# Patient Record
Sex: MALE | Race: WHITE | NOT HISPANIC OR LATINO
[De-identification: names, ages, dates, MRNs, and addresses within clinical notes are randomized per-mention and may not be internally consistent; named-entity substitution may affect disease eponyms.]

---

## 2018-05-11 PROBLEM — Z00.00 ENCOUNTER FOR PREVENTIVE HEALTH EXAMINATION: Status: ACTIVE | Noted: 2018-05-11

## 2019-03-25 ENCOUNTER — APPOINTMENT (OUTPATIENT)
Dept: ORTHOPEDIC SURGERY | Facility: CLINIC | Age: 74
End: 2019-03-25
Payer: MEDICARE

## 2019-03-25 VITALS
BODY MASS INDEX: 28.32 KG/M2 | HEIGHT: 65 IN | SYSTOLIC BLOOD PRESSURE: 223 MMHG | DIASTOLIC BLOOD PRESSURE: 123 MMHG | WEIGHT: 170 LBS | HEART RATE: 92 BPM

## 2019-03-25 DIAGNOSIS — M48.061 SPINAL STENOSIS, LUMBAR REGION WITHOUT NEUROGENIC CLAUDICATION: ICD-10-CM

## 2019-03-25 PROCEDURE — 99204 OFFICE O/P NEW MOD 45 MIN: CPT

## 2019-03-25 NOTE — HISTORY OF PRESENT ILLNESS
[Worsening] : worsening [de-identified] : Referred by Dr. Danyel Figueroa for consult \par Went to [place] 2 months ago. \par In wheelchair \par Has MRI- lumbar stenosis \par He is here with his wife. His radiating right lower extremity pain in the L5 distribution.\par His attempt at an injection with temporary relief.\par No fever chills sweats nausea vomiting no bowel or bladder dysfunction, no recent weight loss or gain no night pain. This history is in addition to the intake form that I personally reviewed.

## 2019-03-25 NOTE — DISCUSSION/SUMMARY
[Surgical risks reviewed] : Surgical risks reviewed [de-identified] : Lumbar stenosis.\par Severe right-sided radiculopathy.\par We discussed all options including surgery.\par Lumbar decompression.\par Risks of surgery include infection, dural tear, nerve root injury, reherniation, future back pain, future leg pain, retained fragment, hematoma, urinary retention, worsening leg symptoms, footdrop, anesthetic risks, blood transfusion risks, positioning pain, visceral and vascular injury, deep vein thrombosis, pulmonary embolus, and death. Patient will need spinal orthosis pre and post operatively. All risks were explained not exclusive to the ones mentioned alternatives were discussed and all questions were answered the patient agrees and understands the above and is in complete agreement with the plan. \par Surgical website was provided.\par He'll go for his next injection but if it does not provide enough relief he should consider surgery and he will.\par Will see Dr. Figueroa tomorrow for LESI\par F/U in 1 week \par All options discussed including rest, medicine, home exercise, acupuncture, Chiropractic care, Physical Therapy, Pain management, and last resort surgery. \par All questions were answered, all alternatives discussed and the patient is in complete agreement with that plan. Follow-up appointment as instructed. Any issues and the patient will call or come in sooner. \par His wife agrees with the plan.\par Thank you for the consultation.

## 2019-03-25 NOTE — PHYSICAL EXAM
[UE/LE] : Sensory: Intact in bilateral upper & lower extremities [ALL] : dorsalis pedis, posterior tibial, femoral, popliteal, and radial 2+ and symmetric bilaterally [de-identified] : 5 out of 5 motor strength, sensation is intact and symmetrical full range of motion flexion extension and rotation, no palpatory tenderness full range of motion of hips knees shoulders and elbows (all four extremities), no atrophy, negative straight leg raise, no pathological reflexes, no swelling, normal ambulation, no apparent distress skin is intact, no palpable lymph nodes, no upper or lower extremity instability, alert and oriented x3 and normal mood. Normal finger-to nose test.  [de-identified] : 2/19/2019 MRI lumbar: Large right paracentral disc extrusion L3-4, Severe stenosis are seen within the left lateral recess and foraminal at L2-3. Severe right lateral recess stenosis at L3-4, Severe foraminal stenosis on the right at L4-5, Severe bilateral foraminal stenosis at L5-S1, canal stenosis most focal L3-4.

## 2019-03-25 NOTE — ADDENDUM
[FreeTextEntry1] : This note was authored by Bessie Inman working as a medical scribe for Dr. Oswald Gambino. The note was reviewed, edited, and revised by Dr. Oswald Gambino whom is in agreement with the exam findings, imaging findings, and treatment plan. 03/25/2019.

## 2019-03-25 NOTE — CONSULT LETTER
[Dear  ___] : Dear  [unfilled], [Consult Letter:] : I had the pleasure of evaluating your patient, [unfilled]. [Please see my note below.] : Please see my note below. [Sincerely,] : Sincerely, [FreeTextEntry2] : Dr. Danyel Fiugeroa\par [address]\par [place]\par [phone]  [FreeTextEntry3] : Oswald Gambino MD

## 2019-04-01 ENCOUNTER — APPOINTMENT (OUTPATIENT)
Dept: ORTHOPEDIC SURGERY | Facility: CLINIC | Age: 74
End: 2019-04-01
Payer: MEDICARE

## 2019-04-01 VITALS
HEART RATE: 103 BPM | WEIGHT: 170 LBS | SYSTOLIC BLOOD PRESSURE: 135 MMHG | DIASTOLIC BLOOD PRESSURE: 70 MMHG | HEIGHT: 65 IN | BODY MASS INDEX: 28.32 KG/M2

## 2019-04-01 PROCEDURE — 99214 OFFICE O/P EST MOD 30 MIN: CPT

## 2019-04-01 PROCEDURE — 72100 X-RAY EXAM L-S SPINE 2/3 VWS: CPT

## 2019-04-01 NOTE — ADDENDUM
[FreeTextEntry1] : This note was authored by Bessie Inman working as a medical scribe for Dr. Oswald Gambino. The note was reviewed, edited, and revised by Dr. Oswald Gambino whom is in agreement with the exam findings, imaging findings, and treatment plan. 04/1/2019.

## 2019-04-01 NOTE — PHYSICAL EXAM
[UE/LE] : Sensory: Intact in bilateral upper & lower extremities [ALL] : dorsalis pedis, posterior tibial, femoral, popliteal, and radial 2+ and symmetric bilaterally [de-identified] : 5 out of 5 motor strength, sensation is intact and symmetrical full range of motion flexion extension and rotation, no palpatory tenderness full range of motion of hips knees shoulders and elbows (all four extremities), no atrophy, negative straight leg raise, no pathological reflexes, no swelling, normal ambulation, no apparent distress skin is intact, no palpable lymph nodes, no upper or lower extremity instability, alert and oriented x3 and normal mood. Normal finger-to nose test.  [de-identified] : AP/lat flex ex:severe degenerative disc disease, loss of lordosis, no sponylolisthesis-reviewed with patient. \[facility] 2/19/2019 MRI lumbar: Large right paracentral disc extrusion L3-4, Severe stenosis are seen within the left lateral recess and foraminal at L2-3. Severe right lateral recess stenosis at L3-4, Severe foraminal stenosis on the right at L4-5, Severe bilateral foraminal stenosis at L5-S1, canal stenosis most focal L3-4.

## 2019-04-01 NOTE — DISCUSSION/SUMMARY
[Surgical risks reviewed] : Surgical risks reviewed [de-identified] : Lumbar stenosis.\par Severe right-sided radiculopathy.\par We discussed all options including surgery.\par Lumbar decompression.\par no fusion\par Risks of surgery include infection, dural tear, nerve root injury, reherniation, future back pain, future leg pain, retained fragment, hematoma, urinary retention, worsening leg symptoms, footdrop, anesthetic risks, blood transfusion risks, positioning pain, visceral and vascular injury, deep vein thrombosis, pulmonary embolus, and death. Patient will need spinal orthosis pre and post operatively. All risks were explained not exclusive to the ones mentioned alternatives were discussed and all questions were answered the patient agrees and understands the above and is in complete agreement with the plan. \par Surgical website was provided.\par All options discussed including rest, medicine, home exercise, acupuncture, Chiropractic care, Physical Therapy, Pain management, and last resort surgery. \par All questions were answered, all alternatives discussed and the patient is in complete agreement with that plan. Follow-up appointment as instructed. Any issues and the patient will call or come in sooner. \par His wife agrees with the plan.\par Thank you for the consultation.

## 2019-04-01 NOTE — REASON FOR VISIT
[Follow-Up Visit] : a follow-up visit for [Initial Consultation] : an initial consultation for [FreeTextEntry2] : Dr. Armond Enrique

## 2019-04-01 NOTE — HISTORY OF PRESENT ILLNESS
[Worsening] : worsening [de-identified] : Referred by Dr. Danyel Figueroa for consult \par Had LESI #4 last week-no help\par Will like to discuss surgery \par Decreased quality of life \par Difficulty with ADL\par Was Taking Oxycodone for 6 weeks \par Unable to walk a half a block without pain\par Went to [place] 2 months ago. \par In wheelchair \par Has MRI- lumbar stenosis \par He is here with his wife. His radiating right lower extremity pain in the L5 distribution.\par His attempt at an injection with temporary relief.\par No fever chills sweats nausea vomiting no bowel or bladder dysfunction, no recent weight loss or gain no night pain. This history is in addition to the intake form that I personally reviewed.

## 2019-04-01 NOTE — CONSULT LETTER
[Dear  ___] : Dear  [unfilled], [Consult Letter:] : I had the pleasure of evaluating your patient, [unfilled]. [Please see my note below.] : Please see my note below. [Sincerely,] : Sincerely, [FreeTextEntry2] : Dr. Armond Enrique\par [address] \par  [FreeTextEntry3] : Oswald Gambino MD

## 2019-05-02 ENCOUNTER — OUTPATIENT (OUTPATIENT)
Dept: OUTPATIENT SERVICES | Facility: HOSPITAL | Age: 74
LOS: 1 days | End: 2019-05-02
Payer: MEDICARE

## 2019-05-02 VITALS
SYSTOLIC BLOOD PRESSURE: 140 MMHG | RESPIRATION RATE: 16 BRPM | HEIGHT: 62 IN | DIASTOLIC BLOOD PRESSURE: 80 MMHG | HEART RATE: 94 BPM | WEIGHT: 173.94 LBS | TEMPERATURE: 98 F

## 2019-05-02 DIAGNOSIS — I10 ESSENTIAL (PRIMARY) HYPERTENSION: ICD-10-CM

## 2019-05-02 DIAGNOSIS — M48.07 SPINAL STENOSIS, LUMBOSACRAL REGION: ICD-10-CM

## 2019-05-02 DIAGNOSIS — Z90.89 ACQUIRED ABSENCE OF OTHER ORGANS: Chronic | ICD-10-CM

## 2019-05-02 DIAGNOSIS — E11.9 TYPE 2 DIABETES MELLITUS WITHOUT COMPLICATIONS: ICD-10-CM

## 2019-05-02 DIAGNOSIS — Z98.890 OTHER SPECIFIED POSTPROCEDURAL STATES: Chronic | ICD-10-CM

## 2019-05-02 DIAGNOSIS — M48.061 SPINAL STENOSIS, LUMBAR REGION WITHOUT NEUROGENIC CLAUDICATION: ICD-10-CM

## 2019-05-02 DIAGNOSIS — R06.83 SNORING: ICD-10-CM

## 2019-05-02 LAB
ANION GAP SERPL CALC-SCNC: 15 MMO/L — HIGH (ref 7–14)
BLD GP AB SCN SERPL QL: NEGATIVE — SIGNIFICANT CHANGE UP
BUN SERPL-MCNC: 17 MG/DL — SIGNIFICANT CHANGE UP (ref 7–23)
CALCIUM SERPL-MCNC: 10 MG/DL — SIGNIFICANT CHANGE UP (ref 8.4–10.5)
CHLORIDE SERPL-SCNC: 102 MMOL/L — SIGNIFICANT CHANGE UP (ref 98–107)
CO2 SERPL-SCNC: 23 MMOL/L — SIGNIFICANT CHANGE UP (ref 22–31)
CREAT SERPL-MCNC: 0.94 MG/DL — SIGNIFICANT CHANGE UP (ref 0.5–1.3)
GLUCOSE SERPL-MCNC: 138 MG/DL — HIGH (ref 70–99)
HBA1C BLD-MCNC: 5.6 % — SIGNIFICANT CHANGE UP (ref 4–5.6)
HCT VFR BLD CALC: 40.5 % — SIGNIFICANT CHANGE UP (ref 39–50)
HGB BLD-MCNC: 12.7 G/DL — LOW (ref 13–17)
MCHC RBC-ENTMCNC: 31 PG — SIGNIFICANT CHANGE UP (ref 27–34)
MCHC RBC-ENTMCNC: 31.4 % — LOW (ref 32–36)
MCV RBC AUTO: 98.8 FL — SIGNIFICANT CHANGE UP (ref 80–100)
NRBC # FLD: 0 K/UL — SIGNIFICANT CHANGE UP (ref 0–0)
PLATELET # BLD AUTO: 310 K/UL — SIGNIFICANT CHANGE UP (ref 150–400)
PMV BLD: 9.5 FL — SIGNIFICANT CHANGE UP (ref 7–13)
POTASSIUM SERPL-MCNC: 4.1 MMOL/L — SIGNIFICANT CHANGE UP (ref 3.5–5.3)
POTASSIUM SERPL-SCNC: 4.1 MMOL/L — SIGNIFICANT CHANGE UP (ref 3.5–5.3)
RBC # BLD: 4.1 M/UL — LOW (ref 4.2–5.8)
RBC # FLD: 15.9 % — HIGH (ref 10.3–14.5)
RH IG SCN BLD-IMP: NEGATIVE — SIGNIFICANT CHANGE UP
SODIUM SERPL-SCNC: 140 MMOL/L — SIGNIFICANT CHANGE UP (ref 135–145)
WBC # BLD: 6.81 K/UL — SIGNIFICANT CHANGE UP (ref 3.8–10.5)
WBC # FLD AUTO: 6.81 K/UL — SIGNIFICANT CHANGE UP (ref 3.8–10.5)

## 2019-05-02 PROCEDURE — 93010 ELECTROCARDIOGRAM REPORT: CPT

## 2019-05-02 RX ORDER — LISINOPRIL 2.5 MG/1
1 TABLET ORAL
Qty: 0 | Refills: 0 | COMMUNITY

## 2019-05-02 RX ORDER — SODIUM CHLORIDE 9 MG/ML
1000 INJECTION, SOLUTION INTRAVENOUS
Refills: 0 | Status: DISCONTINUED | OUTPATIENT
Start: 2019-05-14 | End: 2019-05-17

## 2019-05-02 NOTE — H&P PST ADULT - RS GEN PE MLT RESP DETAILS PC
breath sounds equal/clear to auscultation bilaterally/no wheezes/no rhonchi/no rales/respirations non-labored/airway patent

## 2019-05-02 NOTE — H&P PST ADULT - NEGATIVE ENMT SYMPTOMS
no vertigo/no nasal obstruction/no tinnitus/no nasal congestion/no sinus symptoms/no post-nasal discharge/no nose bleeds/no ear pain

## 2019-05-02 NOTE — H&P PST ADULT - VISION (WITH CORRECTIVE LENSES IF THE PATIENT USUALLY WEARS THEM):
none Partially impaired: cannot see medication labels or newsprint, but can see obstacles in path, and the surrounding layout; can count fingers at arm's length

## 2019-05-02 NOTE — H&P PST ADULT - HISTORY OF PRESENT ILLNESS
74 y/o male with hx HTN, HLD, DM Type II, presents for preop evaluation 72 y/o male with hx HTN, HLD, DM Type II, presents for preop evaluation for Lumbar Laminectomy on 5/14/19. Pt has been c/o lower back pains x years, but got worse since 1/2019. Pt has epidural injections with some relief & was referred to Dr Gambino for further evaluation had MRI done & surgery recommended.

## 2019-05-02 NOTE — H&P PST ADULT - NSICDXPROBLEM_GEN_ALL_CORE_FT
PROBLEM DIAGNOSES  Problem: Lumbar spinal stenosis  Assessment and Plan: scheduled for lumbar laminectomy on 5/14/19  pending labs, comparison ekg, medical & cardiac clearance   surgical scrub & preop instructions given & explained, pt verbalized understanding    Problem: HTN (hypertension)  Assessment and Plan: instructed to take Enalapril & Isosorbide AM of surgery with sips of water    Problem: DM (diabetes mellitus)  Assessment and Plan: instructed to take last jordan Metformin &n Glimepiride AM of 5/13/19  FSBS stat AM of surgery    Problem: Snores  Assessment and Plan: pt met stop bang criteria for RENNY precautions, OR booking notified via fax PROBLEM DIAGNOSES  Problem: Lumbar spinal stenosis  Assessment and Plan: scheduled for lumbar laminectomy on 5/14/19  pending labs, comparison ekg, medical & cardiac clearance   surgical scrub & preop instructions given & explained, pt verbalized understanding    Problem: HTN (hypertension)  Assessment and Plan: instructed to take Enalapril & Isosorbide AM of surgery with sips of water    Problem: DM (diabetes mellitus)  Assessment and Plan: instructed to take last dose Metformin & Glimepiride AM of 5/13/19  FSBS stat AM of surgery    Problem: Snores  Assessment and Plan: pt met stop bang criteria for RENNY precautions, OR booking notified via fax

## 2019-05-03 LAB — SPECIMEN SOURCE: SIGNIFICANT CHANGE UP

## 2019-05-06 LAB — BACTERIA NPH CULT: SIGNIFICANT CHANGE UP

## 2019-05-13 ENCOUNTER — TRANSCRIPTION ENCOUNTER (OUTPATIENT)
Age: 74
End: 2019-05-13

## 2019-05-14 ENCOUNTER — APPOINTMENT (OUTPATIENT)
Dept: ORTHOPEDIC SURGERY | Facility: HOSPITAL | Age: 74
End: 2019-05-14

## 2019-05-14 ENCOUNTER — INPATIENT (INPATIENT)
Facility: HOSPITAL | Age: 74
LOS: 2 days | Discharge: HOME | End: 2019-05-17
Attending: ORTHOPAEDIC SURGERY | Admitting: ORTHOPAEDIC SURGERY
Payer: MEDICARE

## 2019-05-14 ENCOUNTER — APPOINTMENT (OUTPATIENT)
Dept: ORTHOPEDIC SURGERY | Facility: HOSPITAL | Age: 74
End: 2019-05-14
Payer: MEDICARE

## 2019-05-14 VITALS
SYSTOLIC BLOOD PRESSURE: 151 MMHG | DIASTOLIC BLOOD PRESSURE: 86 MMHG | HEIGHT: 62 IN | TEMPERATURE: 98 F | HEART RATE: 101 BPM | OXYGEN SATURATION: 97 % | WEIGHT: 173.94 LBS | RESPIRATION RATE: 17 BRPM

## 2019-05-14 DIAGNOSIS — Z98.890 OTHER SPECIFIED POSTPROCEDURAL STATES: Chronic | ICD-10-CM

## 2019-05-14 DIAGNOSIS — M48.07 SPINAL STENOSIS, LUMBOSACRAL REGION: ICD-10-CM

## 2019-05-14 DIAGNOSIS — Z90.89 ACQUIRED ABSENCE OF OTHER ORGANS: Chronic | ICD-10-CM

## 2019-05-14 LAB
ANION GAP SERPL CALC-SCNC: 13 MMO/L — SIGNIFICANT CHANGE UP (ref 7–14)
BASOPHILS # BLD AUTO: 0.06 K/UL — SIGNIFICANT CHANGE UP (ref 0–0.2)
BASOPHILS NFR BLD AUTO: 0.7 % — SIGNIFICANT CHANGE UP (ref 0–2)
BUN SERPL-MCNC: 14 MG/DL — SIGNIFICANT CHANGE UP (ref 7–23)
CALCIUM SERPL-MCNC: 9 MG/DL — SIGNIFICANT CHANGE UP (ref 8.4–10.5)
CHLORIDE SERPL-SCNC: 103 MMOL/L — SIGNIFICANT CHANGE UP (ref 98–107)
CO2 SERPL-SCNC: 20 MMOL/L — LOW (ref 22–31)
CREAT SERPL-MCNC: 0.96 MG/DL — SIGNIFICANT CHANGE UP (ref 0.5–1.3)
EOSINOPHIL # BLD AUTO: 0.01 K/UL — SIGNIFICANT CHANGE UP (ref 0–0.5)
EOSINOPHIL NFR BLD AUTO: 0.1 % — SIGNIFICANT CHANGE UP (ref 0–6)
GLUCOSE BLDC GLUCOMTR-MCNC: 172 MG/DL — HIGH (ref 70–99)
GLUCOSE BLDC GLUCOMTR-MCNC: 211 MG/DL — HIGH (ref 70–99)
GLUCOSE BLDC GLUCOMTR-MCNC: 226 MG/DL — HIGH (ref 70–99)
GLUCOSE BLDC GLUCOMTR-MCNC: 228 MG/DL — HIGH (ref 70–99)
GLUCOSE SERPL-MCNC: 228 MG/DL — HIGH (ref 70–99)
HCT VFR BLD CALC: 35.4 % — LOW (ref 39–50)
HGB BLD-MCNC: 11.7 G/DL — LOW (ref 13–17)
IMM GRANULOCYTES NFR BLD AUTO: 1.3 % — SIGNIFICANT CHANGE UP (ref 0–1.5)
LYMPHOCYTES # BLD AUTO: 0.56 K/UL — LOW (ref 1–3.3)
LYMPHOCYTES # BLD AUTO: 6.1 % — LOW (ref 13–44)
MCHC RBC-ENTMCNC: 30.5 PG — SIGNIFICANT CHANGE UP (ref 27–34)
MCHC RBC-ENTMCNC: 33.1 % — SIGNIFICANT CHANGE UP (ref 32–36)
MCV RBC AUTO: 92.4 FL — SIGNIFICANT CHANGE UP (ref 80–100)
MONOCYTES # BLD AUTO: 0.27 K/UL — SIGNIFICANT CHANGE UP (ref 0–0.9)
MONOCYTES NFR BLD AUTO: 3 % — SIGNIFICANT CHANGE UP (ref 2–14)
NEUTROPHILS # BLD AUTO: 8.13 K/UL — HIGH (ref 1.8–7.4)
NEUTROPHILS NFR BLD AUTO: 88.8 % — HIGH (ref 43–77)
NRBC # FLD: 0.04 K/UL — SIGNIFICANT CHANGE UP (ref 0–0)
PLATELET # BLD AUTO: 258 K/UL — SIGNIFICANT CHANGE UP (ref 150–400)
PMV BLD: 9.3 FL — SIGNIFICANT CHANGE UP (ref 7–13)
POTASSIUM SERPL-MCNC: 4.4 MMOL/L — SIGNIFICANT CHANGE UP (ref 3.5–5.3)
POTASSIUM SERPL-SCNC: 4.4 MMOL/L — SIGNIFICANT CHANGE UP (ref 3.5–5.3)
RBC # BLD: 3.83 M/UL — LOW (ref 4.2–5.8)
RBC # FLD: 14.3 % — SIGNIFICANT CHANGE UP (ref 10.3–14.5)
RH IG SCN BLD-IMP: NEGATIVE — SIGNIFICANT CHANGE UP
SODIUM SERPL-SCNC: 136 MMOL/L — SIGNIFICANT CHANGE UP (ref 135–145)
WBC # BLD: 9.15 K/UL — SIGNIFICANT CHANGE UP (ref 3.8–10.5)
WBC # FLD AUTO: 9.15 K/UL — SIGNIFICANT CHANGE UP (ref 3.8–10.5)

## 2019-05-14 PROCEDURE — 63047 LAM FACETEC & FORAMOT LUMBAR: CPT | Mod: 82

## 2019-05-14 PROCEDURE — 63048 LAM FACETEC &FORAMOT EA ADDL: CPT | Mod: 82

## 2019-05-14 PROCEDURE — 63048 LAM FACETEC &FORAMOT EA ADDL: CPT

## 2019-05-14 PROCEDURE — 72100 X-RAY EXAM L-S SPINE 2/3 VWS: CPT | Mod: 26

## 2019-05-14 PROCEDURE — 22612 ARTHRD PST TQ 1NTRSPC LUMBAR: CPT | Mod: 82

## 2019-05-14 PROCEDURE — 22612 ARTHRD PST TQ 1NTRSPC LUMBAR: CPT

## 2019-05-14 PROCEDURE — 63047 LAM FACETEC & FORAMOT LUMBAR: CPT

## 2019-05-14 RX ORDER — GLUCAGON INJECTION, SOLUTION 0.5 MG/.1ML
1 INJECTION, SOLUTION SUBCUTANEOUS ONCE
Refills: 0 | Status: DISCONTINUED | OUTPATIENT
Start: 2019-05-14 | End: 2019-05-17

## 2019-05-14 RX ORDER — SIMVASTATIN 20 MG/1
20 TABLET, FILM COATED ORAL AT BEDTIME
Refills: 0 | Status: DISCONTINUED | OUTPATIENT
Start: 2019-05-14 | End: 2019-05-17

## 2019-05-14 RX ORDER — FENOFIBRATE,MICRONIZED 130 MG
145 CAPSULE ORAL DAILY
Refills: 0 | Status: DISCONTINUED | OUTPATIENT
Start: 2019-05-14 | End: 2019-05-17

## 2019-05-14 RX ORDER — DIAZEPAM 5 MG
5 TABLET ORAL EVERY 12 HOURS
Refills: 0 | Status: DISCONTINUED | OUTPATIENT
Start: 2019-05-14 | End: 2019-05-15

## 2019-05-14 RX ORDER — BENZOCAINE AND MENTHOL 5; 1 G/100ML; G/100ML
1 LIQUID ORAL
Refills: 0 | Status: DISCONTINUED | OUTPATIENT
Start: 2019-05-14 | End: 2019-05-17

## 2019-05-14 RX ORDER — ONDANSETRON 8 MG/1
4 TABLET, FILM COATED ORAL EVERY 6 HOURS
Refills: 0 | Status: DISCONTINUED | OUTPATIENT
Start: 2019-05-14 | End: 2019-05-17

## 2019-05-14 RX ORDER — HYDROMORPHONE HYDROCHLORIDE 2 MG/ML
0.5 INJECTION INTRAMUSCULAR; INTRAVENOUS; SUBCUTANEOUS EVERY 4 HOURS
Refills: 0 | Status: DISCONTINUED | OUTPATIENT
Start: 2019-05-14 | End: 2019-05-15

## 2019-05-14 RX ORDER — PANTOPRAZOLE SODIUM 20 MG/1
40 TABLET, DELAYED RELEASE ORAL
Refills: 0 | Status: DISCONTINUED | OUTPATIENT
Start: 2019-05-14 | End: 2019-05-17

## 2019-05-14 RX ORDER — CEFAZOLIN SODIUM 1 G
2000 VIAL (EA) INJECTION EVERY 8 HOURS
Refills: 0 | Status: COMPLETED | OUTPATIENT
Start: 2019-05-14 | End: 2019-05-15

## 2019-05-14 RX ORDER — HYDROMORPHONE HYDROCHLORIDE 2 MG/ML
0.25 INJECTION INTRAMUSCULAR; INTRAVENOUS; SUBCUTANEOUS
Refills: 0 | Status: DISCONTINUED | OUTPATIENT
Start: 2019-05-14 | End: 2019-05-14

## 2019-05-14 RX ORDER — DOCUSATE SODIUM 100 MG
100 CAPSULE ORAL THREE TIMES A DAY
Refills: 0 | Status: DISCONTINUED | OUTPATIENT
Start: 2019-05-14 | End: 2019-05-17

## 2019-05-14 RX ORDER — GLIMEPIRIDE 1 MG
1 TABLET ORAL
Qty: 0 | Refills: 0 | DISCHARGE

## 2019-05-14 RX ORDER — DIAZEPAM 5 MG
5 TABLET ORAL EVERY 8 HOURS
Refills: 0 | Status: DISCONTINUED | OUTPATIENT
Start: 2019-05-14 | End: 2019-05-16

## 2019-05-14 RX ORDER — POLYETHYLENE GLYCOL 3350 17 G/17G
17 POWDER, FOR SOLUTION ORAL DAILY
Refills: 0 | Status: DISCONTINUED | OUTPATIENT
Start: 2019-05-15 | End: 2019-05-17

## 2019-05-14 RX ORDER — METFORMIN HYDROCHLORIDE 850 MG/1
1 TABLET ORAL
Qty: 0 | Refills: 0 | DISCHARGE

## 2019-05-14 RX ORDER — DEXTROSE 50 % IN WATER 50 %
15 SYRINGE (ML) INTRAVENOUS ONCE
Refills: 0 | Status: DISCONTINUED | OUTPATIENT
Start: 2019-05-14 | End: 2019-05-17

## 2019-05-14 RX ORDER — OXYCODONE HYDROCHLORIDE 5 MG/1
10 TABLET ORAL
Refills: 0 | Status: DISCONTINUED | OUTPATIENT
Start: 2019-05-14 | End: 2019-05-15

## 2019-05-14 RX ORDER — SODIUM CHLORIDE 9 MG/ML
1000 INJECTION, SOLUTION INTRAVENOUS
Refills: 0 | Status: DISCONTINUED | OUTPATIENT
Start: 2019-05-14 | End: 2019-05-17

## 2019-05-14 RX ORDER — ONDANSETRON 8 MG/1
4 TABLET, FILM COATED ORAL ONCE
Refills: 0 | Status: DISCONTINUED | OUTPATIENT
Start: 2019-05-14 | End: 2019-05-14

## 2019-05-14 RX ORDER — INSULIN LISPRO 100/ML
VIAL (ML) SUBCUTANEOUS AT BEDTIME
Refills: 0 | Status: DISCONTINUED | OUTPATIENT
Start: 2019-05-14 | End: 2019-05-17

## 2019-05-14 RX ORDER — SIMVASTATIN 20 MG/1
1 TABLET, FILM COATED ORAL
Qty: 0 | Refills: 0 | DISCHARGE

## 2019-05-14 RX ORDER — FENOFIBRATE,MICRONIZED 130 MG
1 CAPSULE ORAL
Qty: 0 | Refills: 0 | DISCHARGE

## 2019-05-14 RX ORDER — SENNA PLUS 8.6 MG/1
2 TABLET ORAL AT BEDTIME
Refills: 0 | Status: DISCONTINUED | OUTPATIENT
Start: 2019-05-14 | End: 2019-05-17

## 2019-05-14 RX ORDER — DEXTROSE 50 % IN WATER 50 %
25 SYRINGE (ML) INTRAVENOUS ONCE
Refills: 0 | Status: DISCONTINUED | OUTPATIENT
Start: 2019-05-14 | End: 2019-05-17

## 2019-05-14 RX ORDER — OXYCODONE HYDROCHLORIDE 5 MG/1
10 TABLET ORAL EVERY 4 HOURS
Refills: 0 | Status: DISCONTINUED | OUTPATIENT
Start: 2019-05-14 | End: 2019-05-14

## 2019-05-14 RX ORDER — MAGNESIUM HYDROXIDE 400 MG/1
30 TABLET, CHEWABLE ORAL EVERY 12 HOURS
Refills: 0 | Status: DISCONTINUED | OUTPATIENT
Start: 2019-05-14 | End: 2019-05-17

## 2019-05-14 RX ORDER — HYDROMORPHONE HYDROCHLORIDE 2 MG/ML
0.5 INJECTION INTRAMUSCULAR; INTRAVENOUS; SUBCUTANEOUS EVERY 4 HOURS
Refills: 0 | Status: DISCONTINUED | OUTPATIENT
Start: 2019-05-14 | End: 2019-05-17

## 2019-05-14 RX ORDER — INSULIN LISPRO 100/ML
VIAL (ML) SUBCUTANEOUS
Refills: 0 | Status: DISCONTINUED | OUTPATIENT
Start: 2019-05-14 | End: 2019-05-17

## 2019-05-14 RX ORDER — DEXAMETHASONE 0.5 MG/5ML
8 ELIXIR ORAL ONCE
Refills: 0 | Status: DISCONTINUED | OUTPATIENT
Start: 2019-05-14 | End: 2019-05-14

## 2019-05-14 RX ORDER — DEXTROSE 50 % IN WATER 50 %
12.5 SYRINGE (ML) INTRAVENOUS ONCE
Refills: 0 | Status: DISCONTINUED | OUTPATIENT
Start: 2019-05-14 | End: 2019-05-17

## 2019-05-14 RX ORDER — ISOSORBIDE MONONITRATE 60 MG/1
1 TABLET, EXTENDED RELEASE ORAL
Qty: 0 | Refills: 0 | DISCHARGE

## 2019-05-14 RX ORDER — HYDROMORPHONE HYDROCHLORIDE 2 MG/ML
0.5 INJECTION INTRAMUSCULAR; INTRAVENOUS; SUBCUTANEOUS
Refills: 0 | Status: DISCONTINUED | OUTPATIENT
Start: 2019-05-14 | End: 2019-05-14

## 2019-05-14 RX ORDER — OXYCODONE HYDROCHLORIDE 5 MG/1
5 TABLET ORAL EVERY 4 HOURS
Refills: 0 | Status: DISCONTINUED | OUTPATIENT
Start: 2019-05-14 | End: 2019-05-14

## 2019-05-14 RX ORDER — ACETAMINOPHEN 500 MG
650 TABLET ORAL EVERY 6 HOURS
Refills: 0 | Status: DISCONTINUED | OUTPATIENT
Start: 2019-05-14 | End: 2019-05-15

## 2019-05-14 RX ORDER — ISOSORBIDE MONONITRATE 60 MG/1
30 TABLET, EXTENDED RELEASE ORAL DAILY
Refills: 0 | Status: DISCONTINUED | OUTPATIENT
Start: 2019-05-14 | End: 2019-05-17

## 2019-05-14 RX ADMIN — Medication 650 MILLIGRAM(S): at 17:56

## 2019-05-14 RX ADMIN — HYDROMORPHONE HYDROCHLORIDE 0.25 MILLIGRAM(S): 2 INJECTION INTRAMUSCULAR; INTRAVENOUS; SUBCUTANEOUS at 12:05

## 2019-05-14 RX ADMIN — SIMVASTATIN 20 MILLIGRAM(S): 20 TABLET, FILM COATED ORAL at 21:33

## 2019-05-14 RX ADMIN — Medication 100 MILLIGRAM(S): at 17:56

## 2019-05-14 RX ADMIN — SENNA PLUS 2 TABLET(S): 8.6 TABLET ORAL at 21:32

## 2019-05-14 RX ADMIN — Medication 2: at 17:56

## 2019-05-14 RX ADMIN — HYDROMORPHONE HYDROCHLORIDE 0.25 MILLIGRAM(S): 2 INJECTION INTRAMUSCULAR; INTRAVENOUS; SUBCUTANEOUS at 12:15

## 2019-05-14 RX ADMIN — SODIUM CHLORIDE 100 MILLILITER(S): 9 INJECTION, SOLUTION INTRAVENOUS at 17:57

## 2019-05-14 RX ADMIN — Medication 2: at 13:56

## 2019-05-14 RX ADMIN — Medication 650 MILLIGRAM(S): at 21:32

## 2019-05-14 RX ADMIN — HYDROMORPHONE HYDROCHLORIDE 0.25 MILLIGRAM(S): 2 INJECTION INTRAMUSCULAR; INTRAVENOUS; SUBCUTANEOUS at 11:53

## 2019-05-14 RX ADMIN — Medication 5 MILLIGRAM(S): at 14:06

## 2019-05-14 RX ADMIN — HYDROMORPHONE HYDROCHLORIDE 0.25 MILLIGRAM(S): 2 INJECTION INTRAMUSCULAR; INTRAVENOUS; SUBCUTANEOUS at 12:00

## 2019-05-14 RX ADMIN — Medication 100 MILLIGRAM(S): at 21:33

## 2019-05-14 NOTE — PROGRESS NOTE ADULT - SUBJECTIVE AND OBJECTIVE BOX
Patient seen and examined in the PACU.  Incisional pain is tolerable. Denies radiation of pain or paresthesia to the bilateral lower extremities. No acute issues.    Denies CP, SOB, N/V.       HEALTH ISSUES - PROBLEM Dx:  Snores  DM (diabetes mellitus)  HTN (hypertension)  Lumbar spinal stenosis          MEDICATIONS  (STANDING):  acetaminophen   Tablet .. 650 milliGRAM(s) Oral every 6 hours  ceFAZolin   IVPB 2000 milliGRAM(s) IV Intermittent every 8 hours  dextrose 5%. 1000 milliLiter(s) IV Continuous <Continuous>  dextrose 50% Injectable 12.5 Gram(s) IV Push once  dextrose 50% Injectable 25 Gram(s) IV Push once  dextrose 50% Injectable 25 Gram(s) IV Push once  diazepam    Tablet 5 milliGRAM(s) Oral every 8 hours  docusate sodium 100 milliGRAM(s) Oral three times a day  fenofibrate Tablet 145 milliGRAM(s) Oral daily  insulin lispro (HumaLOG) corrective regimen sliding scale   SubCutaneous three times a day before meals  insulin lispro (HumaLOG) corrective regimen sliding scale   SubCutaneous at bedtime  isosorbide   mononitrate ER Tablet (IMDUR) 30 milliGRAM(s) Oral daily  lactated ringers. 1000 milliLiter(s) IV Continuous <Continuous>  lactated ringers. 1000 milliLiter(s) IV Continuous <Continuous>  multivitamin 1 Tablet(s) Oral daily  pantoprazole    Tablet 40 milliGRAM(s) Oral before breakfast  senna 2 Tablet(s) Oral at bedtime  simvastatin 20 milliGRAM(s) Oral at bedtime      Allergies    No Known Allergies    Intolerances        PAST MEDICAL & SURGICAL HISTORY:  DM (diabetes mellitus)  HLD (hyperlipidemia)  HTN (hypertension)  History of tonsillectomy  History of inguinal hernia repair, bilateral                            11.7   9.15  )-----------( 258      ( 14 May 2019 12:00 )             35.4       14 May 2019 12:00    136    |  103    |  14     ----------------------------<  228    4.4     |  20     |  0.96     Ca    9.0        14 May 2019 12:00                Vital Signs Last 24 Hrs  T(C): 36.5 (05-14-19 @ 12:00), Max: 36.8 (05-14-19 @ 06:33)  T(F): 97.7 (05-14-19 @ 12:00), Max: 98.2 (05-14-19 @ 06:33)  HR: 98 (05-14-19 @ 13:00) (89 - 101)  BP: 125/82 (05-14-19 @ 12:00) (111/68 - 151/86)  BP(mean): 93 (05-14-19 @ 12:00) (77 - 93)  RR: 20 (05-14-19 @ 13:00) (10 - 22)  SpO2: 96% (05-14-19 @ 13:00) (94% - 100%)    Gen: NAD VSS    Dressing adherent clean dry intact  HV (sewn) serosanguinous  RLE: 5/5 IP, 5/5 Q, 5/5 HS, 5/5 TA, 5/5 GS, 5/5 EHL  LLE: 5/5 IP, 5/5 Q, 5/5 HS, 5/5 TA, 5/5 GS, 5/5 EHL  Sensation equal and grossly intact      A/P: 72 y/o M status post L2-L5 Laminectomy, L4-L5 In situ Fusion secondary to stenosis with listhesis L4 on L5.  The patient is neurologically stable and without radicular symptoms. Incisional pain is controlled.  Stable for transfer to the floor.  Pain control  Neuro checks as per routine  Cont HV drain  WBAT/PT/OT/OOB  LSO with ambulation  PPI, bowel regimen  Inc spirometry  FU Labs  SCDs  Medical co-management appreciated  Dispo plan

## 2019-05-15 ENCOUNTER — TRANSCRIPTION ENCOUNTER (OUTPATIENT)
Age: 74
End: 2019-05-15

## 2019-05-15 DIAGNOSIS — I10 ESSENTIAL (PRIMARY) HYPERTENSION: ICD-10-CM

## 2019-05-15 DIAGNOSIS — G89.18 OTHER ACUTE POSTPROCEDURAL PAIN: ICD-10-CM

## 2019-05-15 LAB
ANION GAP SERPL CALC-SCNC: 12 MMO/L — SIGNIFICANT CHANGE UP (ref 7–14)
BASOPHILS # BLD AUTO: 0.07 K/UL — SIGNIFICANT CHANGE UP (ref 0–0.2)
BASOPHILS NFR BLD AUTO: 0.7 % — SIGNIFICANT CHANGE UP (ref 0–2)
BUN SERPL-MCNC: 13 MG/DL — SIGNIFICANT CHANGE UP (ref 7–23)
CALCIUM SERPL-MCNC: 9.8 MG/DL — SIGNIFICANT CHANGE UP (ref 8.4–10.5)
CHLORIDE SERPL-SCNC: 104 MMOL/L — SIGNIFICANT CHANGE UP (ref 98–107)
CO2 SERPL-SCNC: 25 MMOL/L — SIGNIFICANT CHANGE UP (ref 22–31)
CREAT SERPL-MCNC: 1.04 MG/DL — SIGNIFICANT CHANGE UP (ref 0.5–1.3)
EOSINOPHIL # BLD AUTO: 0.04 K/UL — SIGNIFICANT CHANGE UP (ref 0–0.5)
EOSINOPHIL NFR BLD AUTO: 0.4 % — SIGNIFICANT CHANGE UP (ref 0–6)
GLUCOSE BLDC GLUCOMTR-MCNC: 182 MG/DL — HIGH (ref 70–99)
GLUCOSE BLDC GLUCOMTR-MCNC: 183 MG/DL — HIGH (ref 70–99)
GLUCOSE BLDC GLUCOMTR-MCNC: 204 MG/DL — HIGH (ref 70–99)
GLUCOSE BLDC GLUCOMTR-MCNC: 213 MG/DL — HIGH (ref 70–99)
GLUCOSE SERPL-MCNC: 173 MG/DL — HIGH (ref 70–99)
HCT VFR BLD CALC: 38.1 % — LOW (ref 39–50)
HGB BLD-MCNC: 12.4 G/DL — LOW (ref 13–17)
IMM GRANULOCYTES NFR BLD AUTO: 0.8 % — SIGNIFICANT CHANGE UP (ref 0–1.5)
LYMPHOCYTES # BLD AUTO: 1.56 K/UL — SIGNIFICANT CHANGE UP (ref 1–3.3)
LYMPHOCYTES # BLD AUTO: 15.5 % — SIGNIFICANT CHANGE UP (ref 13–44)
MCHC RBC-ENTMCNC: 30.9 PG — SIGNIFICANT CHANGE UP (ref 27–34)
MCHC RBC-ENTMCNC: 32.5 % — SIGNIFICANT CHANGE UP (ref 32–36)
MCV RBC AUTO: 95 FL — SIGNIFICANT CHANGE UP (ref 80–100)
MONOCYTES # BLD AUTO: 1.18 K/UL — HIGH (ref 0–0.9)
MONOCYTES NFR BLD AUTO: 11.7 % — SIGNIFICANT CHANGE UP (ref 2–14)
NEUTROPHILS # BLD AUTO: 7.13 K/UL — SIGNIFICANT CHANGE UP (ref 1.8–7.4)
NEUTROPHILS NFR BLD AUTO: 70.9 % — SIGNIFICANT CHANGE UP (ref 43–77)
NRBC # FLD: 0 K/UL — SIGNIFICANT CHANGE UP (ref 0–0)
PLATELET # BLD AUTO: 283 K/UL — SIGNIFICANT CHANGE UP (ref 150–400)
PMV BLD: 9.4 FL — SIGNIFICANT CHANGE UP (ref 7–13)
POTASSIUM SERPL-MCNC: 4.2 MMOL/L — SIGNIFICANT CHANGE UP (ref 3.5–5.3)
POTASSIUM SERPL-SCNC: 4.2 MMOL/L — SIGNIFICANT CHANGE UP (ref 3.5–5.3)
RBC # BLD: 4.01 M/UL — LOW (ref 4.2–5.8)
RBC # FLD: 14.5 % — SIGNIFICANT CHANGE UP (ref 10.3–14.5)
SODIUM SERPL-SCNC: 141 MMOL/L — SIGNIFICANT CHANGE UP (ref 135–145)
WBC # BLD: 10.06 K/UL — SIGNIFICANT CHANGE UP (ref 3.8–10.5)
WBC # FLD AUTO: 10.06 K/UL — SIGNIFICANT CHANGE UP (ref 3.8–10.5)

## 2019-05-15 PROCEDURE — 99223 1ST HOSP IP/OBS HIGH 75: CPT

## 2019-05-15 RX ORDER — OXYCODONE HYDROCHLORIDE 5 MG/1
10 TABLET ORAL EVERY 4 HOURS
Refills: 0 | Status: DISCONTINUED | OUTPATIENT
Start: 2019-05-15 | End: 2019-05-15

## 2019-05-15 RX ORDER — OXYCODONE HYDROCHLORIDE 5 MG/1
5 TABLET ORAL EVERY 4 HOURS
Refills: 0 | Status: DISCONTINUED | OUTPATIENT
Start: 2019-05-15 | End: 2019-05-16

## 2019-05-15 RX ORDER — OXYCODONE HYDROCHLORIDE 5 MG/1
15 TABLET ORAL EVERY 4 HOURS
Refills: 0 | Status: DISCONTINUED | OUTPATIENT
Start: 2019-05-15 | End: 2019-05-15

## 2019-05-15 RX ORDER — OXYCODONE HYDROCHLORIDE 5 MG/1
10 TABLET ORAL EVERY 4 HOURS
Refills: 0 | Status: DISCONTINUED | OUTPATIENT
Start: 2019-05-15 | End: 2019-05-16

## 2019-05-15 RX ORDER — ACETAMINOPHEN 500 MG
1000 TABLET ORAL ONCE
Refills: 0 | Status: COMPLETED | OUTPATIENT
Start: 2019-05-15 | End: 2019-05-15

## 2019-05-15 RX ORDER — HYDROMORPHONE HYDROCHLORIDE 2 MG/ML
0.5 INJECTION INTRAMUSCULAR; INTRAVENOUS; SUBCUTANEOUS ONCE
Refills: 0 | Status: DISCONTINUED | OUTPATIENT
Start: 2019-05-15 | End: 2019-05-15

## 2019-05-15 RX ADMIN — Medication 2: at 17:24

## 2019-05-15 RX ADMIN — HYDROMORPHONE HYDROCHLORIDE 0.5 MILLIGRAM(S): 2 INJECTION INTRAMUSCULAR; INTRAVENOUS; SUBCUTANEOUS at 10:15

## 2019-05-15 RX ADMIN — Medication 1: at 12:44

## 2019-05-15 RX ADMIN — Medication 1000 MILLIGRAM(S): at 10:15

## 2019-05-15 RX ADMIN — ISOSORBIDE MONONITRATE 30 MILLIGRAM(S): 60 TABLET, EXTENDED RELEASE ORAL at 12:44

## 2019-05-15 RX ADMIN — Medication 1: at 07:45

## 2019-05-15 RX ADMIN — Medication 400 MILLIGRAM(S): at 16:07

## 2019-05-15 RX ADMIN — OXYCODONE HYDROCHLORIDE 10 MILLIGRAM(S): 5 TABLET ORAL at 17:29

## 2019-05-15 RX ADMIN — Medication 145 MILLIGRAM(S): at 12:44

## 2019-05-15 RX ADMIN — Medication 1000 MILLIGRAM(S): at 16:20

## 2019-05-15 RX ADMIN — OXYCODONE HYDROCHLORIDE 10 MILLIGRAM(S): 5 TABLET ORAL at 18:25

## 2019-05-15 RX ADMIN — Medication 1 TABLET(S): at 12:44

## 2019-05-15 RX ADMIN — Medication 400 MILLIGRAM(S): at 10:02

## 2019-05-15 RX ADMIN — Medication 5 MILLIGRAM(S): at 07:36

## 2019-05-15 RX ADMIN — SIMVASTATIN 20 MILLIGRAM(S): 20 TABLET, FILM COATED ORAL at 21:46

## 2019-05-15 RX ADMIN — Medication 100 MILLIGRAM(S): at 00:05

## 2019-05-15 RX ADMIN — Medication 5 MILLIGRAM(S): at 17:24

## 2019-05-15 RX ADMIN — PANTOPRAZOLE SODIUM 40 MILLIGRAM(S): 20 TABLET, DELAYED RELEASE ORAL at 05:01

## 2019-05-15 RX ADMIN — HYDROMORPHONE HYDROCHLORIDE 0.5 MILLIGRAM(S): 2 INJECTION INTRAMUSCULAR; INTRAVENOUS; SUBCUTANEOUS at 10:01

## 2019-05-15 NOTE — OCCUPATIONAL THERAPY INITIAL EVALUATION ADULT - DIAGNOSIS, OT EVAL
s/p Lumbar Laminectomy; Decreased standing balance; Decreased functional mobility; Decreased ADL management

## 2019-05-15 NOTE — DISCHARGE NOTE NURSING/CASE MANAGEMENT/SOCIAL WORK - NSSCTYPOFSERV_GEN_ALL_CORE
Nurse to visit patient 5/20/2019 and arrange for Therapist. Nurse to visit patient 5/20/2019 and Nurse will  arrange for Therapist.

## 2019-05-15 NOTE — DISCHARGE NOTE NURSING/CASE MANAGEMENT/SOCIAL WORK - NSDCDPATPORTLINK_GEN_ALL_CORE
You can access the [facility] Patient Portal, offered by [facility], by registering with the following website: [email or web address]

## 2019-05-15 NOTE — DISCHARGE NOTE NURSING/CASE MANAGEMENT/SOCIAL WORK - NSDCPECAREGIVERED_GEN_ALL_CORE
Medline and carenotes for surgical procedure as well as Pre-Diabetes literature for review and DC Medications and side effects literature for patient reference./Yes

## 2019-05-15 NOTE — DISCHARGE NOTE NURSING/CASE MANAGEMENT/SOCIAL WORK - NSDCFUADDAPPT_GEN_ALL_CORE_FT
Follow-up appt with Dr. Gambino in the office for post-op check as well as PMD for continuity of care.

## 2019-05-15 NOTE — CONSULT NOTE ADULT - PROBLEM SELECTOR RECOMMENDATION 2
Pain better controlled in the afternoon.   - oxycodone 10mg q4h PRN for moderate pain, oxycodone 15mg q4h PRN for severe pain, diazepam PO 5mg q8h; hydromorphone 0.5mg IV q3h for PRN breakthrough pain  - daily bowel regimen to ensure adequate BMs, continue senna, colace, PRN enema

## 2019-05-15 NOTE — CONSULT NOTE ADULT - PROBLEM SELECTOR RECOMMENDATION 9
- management as per ortho - management as per ortho  - SCDs for DVT ppx  - encourage incentive spirometry use  - daily PT

## 2019-05-15 NOTE — OCCUPATIONAL THERAPY INITIAL EVALUATION ADULT - ADDITIONAL COMMENTS
Pt. states 3 months ago, he was "independent" but has gradually benefited from assistance since then.

## 2019-05-15 NOTE — OCCUPATIONAL THERAPY INITIAL EVALUATION ADULT - GENERAL OBSERVATIONS, REHAB EVAL
Pt. received sitting in chair next to bed. No acute distress. Patient agreed to evaluation from Occupational Therapist. +Clean dry intact dressing to Back, +Heplock, +Accordion Drain. Wife bedside.

## 2019-05-15 NOTE — PROGRESS NOTE ADULT - SUBJECTIVE AND OBJECTIVE BOX
Patient seen and examined at bedside.  Incisional pain is moderate to severe.  Denies radiation of pain or paresthesia to the bilateral lower extremities.  He has been out of bed and ambulatory.   Denies gait disturbances, weakness, issues with bowel or bladder dysfunction.  Denies CP, SOB, N/V/F/C,HA.     HEALTH ISSUES - PROBLEM Dx:  Snores  DM (diabetes mellitus)  HTN (hypertension)  Lumbar spinal stenosis          MEDICATIONS  (STANDING):  dextrose 5%. 1000 milliLiter(s) IV Continuous <Continuous>  dextrose 50% Injectable 12.5 Gram(s) IV Push once  dextrose 50% Injectable 25 Gram(s) IV Push once  dextrose 50% Injectable 25 Gram(s) IV Push once  diazepam    Tablet 5 milliGRAM(s) Oral every 8 hours  docusate sodium 100 milliGRAM(s) Oral three times a day  enalapril 5 milliGRAM(s) Oral daily  fenofibrate Tablet 145 milliGRAM(s) Oral daily  insulin lispro (HumaLOG) corrective regimen sliding scale   SubCutaneous three times a day before meals  insulin lispro (HumaLOG) corrective regimen sliding scale   SubCutaneous at bedtime  isosorbide   mononitrate ER Tablet (IMDUR) 30 milliGRAM(s) Oral daily  lactated ringers. 1000 milliLiter(s) IV Continuous <Continuous>  lactated ringers. 1000 milliLiter(s) IV Continuous <Continuous>  multivitamin 1 Tablet(s) Oral daily  pantoprazole    Tablet 40 milliGRAM(s) Oral before breakfast  polyethylene glycol 3350 17 Gram(s) Oral daily  senna 2 Tablet(s) Oral at bedtime  simvastatin 20 milliGRAM(s) Oral at bedtime      Allergies    No Known Allergies    Intolerances    Jardiance (Other; Nausea)  Victoza (Other; Nausea)      PAST MEDICAL & SURGICAL HISTORY:  DM (diabetes mellitus)  HLD (hyperlipidemia)  HTN (hypertension)  History of tonsillectomy  History of inguinal hernia repair, bilateral                            12.4   10.06 )-----------( 283      ( 15 May 2019 05:30 )             38.1       15 May 2019 05:30    141    |  104    |  13     ----------------------------<  173    4.2     |  25     |  1.04     Ca    9.8        15 May 2019 05:30                Vital Signs Last 24 Hrs  T(C): 36.6 (05-15-19 @ 12:44), Max: 37 (05-14-19 @ 21:29)  T(F): 97.9 (05-15-19 @ 12:44), Max: 98.6 (05-14-19 @ 21:29)  HR: 105 (05-15-19 @ 12:44) (99 - 110)  BP: 149/87 (05-15-19 @ 12:44) (148/78 - 171/84)  BP(mean): --  RR: 15 (05-15-19 @ 12:44) (15 - 18)  SpO2: 96% (05-15-19 @ 12:44) (94% - 96%)    Gen: NAD  VSS  Dressing clean dry intact  HV (sewn) 60/225                L2             L3             L4               L5            S1  R         5/5           5/5          5/5             5/5           5/5  L          5/5          5/5           5/5             5/5           5/5    Sensation equal and grossly intact      A/P: 74 y/o M status post L2-L5 Laminectomy, L4-L5 In situ fusion -POD 1.  The patient is neurologically stable and without radicular symptoms.  He is working toward meeting functional goals.  No acute issues.   Pain control  Neuro checks  Cont HV drain  WBAT/PT/OT/OOB  LSO with ambulation  Inc spirometry  PPI, bowel regimen  FU Labs  SCDs  Medical co-management appreciated  Dispo plan

## 2019-05-15 NOTE — PROGRESS NOTE ADULT - SUBJECTIVE AND OBJECTIVE BOX
Patient seen and examined; pain well controlled; no acute overnight events; denies CP/SOB; was OOB to chair and ambulated to bathroom with assistance; patient states preoperative RLE symptoms completely resolved, very happy    Vital Signs Last 24 Hrs  T(C): 36.7 (15 May 2019 04:56), Max: 37 (14 May 2019 21:29)  T(F): 98.1 (15 May 2019 04:56), Max: 98.6 (14 May 2019 21:29)  HR: 99 (15 May 2019 04:56) (89 - 110)  BP: 155/99 (15 May 2019 04:56) (111/68 - 171/84)  BP(mean): 94 (14 May 2019 13:30) (77 - 94)  RR: 15 (15 May 2019 04:56) (10 - 22)  SpO2: 96% (15 May 2019 04:56) (94% - 100%)                          11.7   9.15  )-----------( 258      ( 14 May 2019 12:00 )             35.4     05-14    136  |  103  |  14  ----------------------------<  228<H>  4.4   |  20<L>  |  0.96    Ca    9.0      14 May 2019 12:00      MEDICATIONS  (STANDING):  acetaminophen   Tablet .. 650 milliGRAM(s) Oral every 6 hours  dextrose 5%. 1000 milliLiter(s) (50 mL/Hr) IV Continuous <Continuous>  dextrose 50% Injectable 12.5 Gram(s) IV Push once  dextrose 50% Injectable 25 Gram(s) IV Push once  dextrose 50% Injectable 25 Gram(s) IV Push once  diazepam    Tablet 5 milliGRAM(s) Oral every 8 hours  docusate sodium 100 milliGRAM(s) Oral three times a day  fenofibrate Tablet 145 milliGRAM(s) Oral daily  insulin lispro (HumaLOG) corrective regimen sliding scale   SubCutaneous three times a day before meals  insulin lispro (HumaLOG) corrective regimen sliding scale   SubCutaneous at bedtime  isosorbide   mononitrate ER Tablet (IMDUR) 30 milliGRAM(s) Oral daily  lactated ringers. 1000 milliLiter(s) (30 mL/Hr) IV Continuous <Continuous>  lactated ringers. 1000 milliLiter(s) (100 mL/Hr) IV Continuous <Continuous>  multivitamin 1 Tablet(s) Oral daily  pantoprazole    Tablet 40 milliGRAM(s) Oral before breakfast  polyethylene glycol 3350 17 Gram(s) Oral daily  senna 2 Tablet(s) Oral at bedtime  simvastatin 20 milliGRAM(s) Oral at bedtime    MEDICATIONS  (PRN):  benzocaine 15 mG/menthol 3.6 mG Lozenge 1 Lozenge Oral five times a day PRN Sore Throat  dextrose 40% Gel 15 Gram(s) Oral once PRN Blood Glucose LESS THAN 70 milliGRAM(s)/deciliter  diazepam    Tablet 5 milliGRAM(s) Oral every 12 hours PRN Spasm  glucagon  Injectable 1 milliGRAM(s) IntraMuscular once PRN Glucose LESS THAN 70 milligrams/deciliter  HYDROmorphone  Injectable 0.5 milliGRAM(s) IV Push every 4 hours PRN Severe Pain (7 - 10)  HYDROmorphone  Injectable 0.5 milliGRAM(s) IV Push every 4 hours PRN Breakthrough pain  magnesium hydroxide Suspension 30 milliLiter(s) Oral every 12 hours PRN Constipation  ondansetron Injectable 4 milliGRAM(s) IV Push every 6 hours PRN Nausea  oxyCODONE    IR 10 milliGRAM(s) Oral every 3 hours PRN mild to moderate  saline laxative (FLEET) Rectal Enema 1 Enema Rectal once PRN constipation    NAD  Lumbar dressing c/d/i  HV in place with good seal, s/s output -> 62/225  5/5 B/L IP/Q/H/TA/GS/EHL/FHL  SIRI PHAM

## 2019-05-15 NOTE — OCCUPATIONAL THERAPY INITIAL EVALUATION ADULT - ANTICIPATED DISCHARGE DISPOSITION, OT EVAL
[facility]. However, please continue to follow progress notes closely. Pt with preference to return home with therapy services.

## 2019-05-15 NOTE — CONSULT NOTE ADULT - SUBJECTIVE AND OBJECTIVE BOX
CHIEF COMPLAINT: Patient is a 73y old  Male who presents with a chief complaint of s/p L2-L5 Laminectomy, L4-L5 In situ fusion (15 May 2019 14:23)      HPI:   Mr. Franklin is a 74 yo man with hx of morbid obesity, HTN, HLD, and DM Type II admitted for elective Lumbar Laminectomy, now POD#1.         Allergies  No Known Allergies    Intolerances  Jardiance (Other; Nausea)  Victoza (Other; Nausea)    HOME MEDICATIONS: [x] Reviewed    MEDICATIONS  (STANDING):  acetaminophen  IVPB .. 1000 milliGRAM(s) IV Intermittent once  dextrose 5%. 1000 milliLiter(s) (50 mL/Hr) IV Continuous <Continuous>  dextrose 50% Injectable 12.5 Gram(s) IV Push once  dextrose 50% Injectable 25 Gram(s) IV Push once  dextrose 50% Injectable 25 Gram(s) IV Push once  diazepam    Tablet 5 milliGRAM(s) Oral every 8 hours  docusate sodium 100 milliGRAM(s) Oral three times a day  enalapril 5 milliGRAM(s) Oral daily  fenofibrate Tablet 145 milliGRAM(s) Oral daily  insulin lispro (HumaLOG) corrective regimen sliding scale   SubCutaneous three times a day before meals  insulin lispro (HumaLOG) corrective regimen sliding scale   SubCutaneous at bedtime  isosorbide   mononitrate ER Tablet (IMDUR) 30 milliGRAM(s) Oral daily  lactated ringers. 1000 milliLiter(s) (30 mL/Hr) IV Continuous <Continuous>  lactated ringers. 1000 milliLiter(s) (100 mL/Hr) IV Continuous <Continuous>  multivitamin 1 Tablet(s) Oral daily  pantoprazole    Tablet 40 milliGRAM(s) Oral before breakfast  polyethylene glycol 3350 17 Gram(s) Oral daily  senna 2 Tablet(s) Oral at bedtime  simvastatin 20 milliGRAM(s) Oral at bedtime    MEDICATIONS  (PRN):  benzocaine 15 mG/menthol 3.6 mG Lozenge 1 Lozenge Oral five times a day PRN Sore Throat  dextrose 40% Gel 15 Gram(s) Oral once PRN Blood Glucose LESS THAN 70 milliGRAM(s)/deciliter  glucagon  Injectable 1 milliGRAM(s) IntraMuscular once PRN Glucose LESS THAN 70 milligrams/deciliter  HYDROmorphone  Injectable 0.5 milliGRAM(s) IV Push every 4 hours PRN Breakthrough pain  magnesium hydroxide Suspension 30 milliLiter(s) Oral every 12 hours PRN Constipation  ondansetron Injectable 4 milliGRAM(s) IV Push every 6 hours PRN Nausea  oxyCODONE    IR 10 milliGRAM(s) Oral every 3 hours PRN mild to moderate  oxyCODONE    IR 15 milliGRAM(s) Oral every 4 hours PRN Severe Pain (7 - 10)  saline laxative (FLEET) Rectal Enema 1 Enema Rectal once PRN constipation      PAST MEDICAL & SURGICAL HISTORY:  DM (diabetes mellitus)  HLD (hyperlipidemia)  HTN (hypertension)  History of tonsillectomy  History of inguinal hernia repair, bilateral  [x ] Reviewed     SOCIAL HISTORY:  [x] Substance abuse: None  [x] Alcohol use: Occasionally drinks wine  [x] Tobacco: Active smoker    FAMILY HISTORY:  Family history of diabetes mellitus (DM)  [x] No pertinent family history in first degree relatives     REVIEW OF SYSTEMS:  [x] All other ROS negative  [  ] Unable to obtain due to poor mental status    Vital Signs Last 24 Hrs  T(C): 36.6 (15 May 2019 12:44), Max: 37 (14 May 2019 21:29)  T(F): 97.9 (15 May 2019 12:44), Max: 98.6 (14 May 2019 21:29)  HR: 105 (15 May 2019 12:44) (99 - 110)  BP: 149/87 (15 May 2019 12:44) (148/78 - 171/84)  BP(mean): --  RR: 15 (15 May 2019 12:44) (15 - 18)  SpO2: 96% (15 May 2019 12:44) (94% - 96%)    PHYSICAL EXAM:  GENERAL: NAD, well-groomed, well-developed  HEAD:  Atraumatic, Normocephalic  EYES: EOMI, PERRLA, conjunctiva and sclera clear  ENMT: Moist mucous membranes  NECK: Supple, No JVD  RESPIRATORY: Clear to auscultation bilaterally; No rales, rhonchi, wheezing, or rubs  CARDIOVASCULAR: Regular rate and rhythm; No murmurs, rubs, or gallops  GASTROINTESTINAL: Soft, Nontender, Nondistended; Bowel sounds present  GENITOURINARY: Not examined  EXTREMITIES:  2+ Peripheral Pulses, No clubbing, cyanosis, or edema  NERVOUS SYSTEM:  Alert & Oriented X3; Moving all 4 extremities; No gross sensory deficits  HEME/LYMPH: No lymphadenopathy noted  SKIN: No rashes or lesions; Incisions C/D/I    LABS:                        12.4   10.06 )-----------( 283      ( 15 May 2019 05:30 )             38.1     Hemoglobin: 12.4 g/dL (05-15 @ 05:30)  Hemoglobin: 11.7 g/dL (05-14 @ 12:00)    05-15    141  |  104  |  13  ----------------------------<  173<H>  4.2   |  25  |  1.04    Ca    9.8      15 May 2019 05:30                  [ ] Consultant(s) Notes Reviewed  [x] Care Discussed with Consultants/Other Providers: Ortho PA - discussed CHIEF COMPLAINT: Patient is a 73y old  Male who presents with a chief complaint of s/p L2-L5 Laminectomy, L4-L5 In situ fusion (15 May 2019 14:23)      HPI:   Mr. Franklin is a 72 yo man with hx of morbid obesity, HTN, HLD, and DM Type II admitted for elective Lumbar Laminectomy, now POD#1. Patient drowsy this morning and with suboptimal pain control. Patient's wife present at bedside. Patient did not sleep well overnight. However, patient was able to participate with PT and ambulate to the bathroom. As per wife, he is using incentive spirometry as recommended. Patient without chest pain, SOB, n/v or abdominal pain. Passed TOV.        Allergies  No Known Allergies    Intolerances  Jardiance (Other; Nausea)  Victoza (Other; Nausea)    HOME MEDICATIONS: [x] Reviewed    MEDICATIONS  (STANDING):  acetaminophen  IVPB .. 1000 milliGRAM(s) IV Intermittent once  dextrose 5%. 1000 milliLiter(s) (50 mL/Hr) IV Continuous <Continuous>  dextrose 50% Injectable 12.5 Gram(s) IV Push once  dextrose 50% Injectable 25 Gram(s) IV Push once  dextrose 50% Injectable 25 Gram(s) IV Push once  diazepam    Tablet 5 milliGRAM(s) Oral every 8 hours  docusate sodium 100 milliGRAM(s) Oral three times a day  enalapril 5 milliGRAM(s) Oral daily  fenofibrate Tablet 145 milliGRAM(s) Oral daily  insulin lispro (HumaLOG) corrective regimen sliding scale   SubCutaneous three times a day before meals  insulin lispro (HumaLOG) corrective regimen sliding scale   SubCutaneous at bedtime  isosorbide   mononitrate ER Tablet (IMDUR) 30 milliGRAM(s) Oral daily  lactated ringers. 1000 milliLiter(s) (30 mL/Hr) IV Continuous <Continuous>  lactated ringers. 1000 milliLiter(s) (100 mL/Hr) IV Continuous <Continuous>  multivitamin 1 Tablet(s) Oral daily  pantoprazole    Tablet 40 milliGRAM(s) Oral before breakfast  polyethylene glycol 3350 17 Gram(s) Oral daily  senna 2 Tablet(s) Oral at bedtime  simvastatin 20 milliGRAM(s) Oral at bedtime    MEDICATIONS  (PRN):  benzocaine 15 mG/menthol 3.6 mG Lozenge 1 Lozenge Oral five times a day PRN Sore Throat  dextrose 40% Gel 15 Gram(s) Oral once PRN Blood Glucose LESS THAN 70 milliGRAM(s)/deciliter  glucagon  Injectable 1 milliGRAM(s) IntraMuscular once PRN Glucose LESS THAN 70 milligrams/deciliter  HYDROmorphone  Injectable 0.5 milliGRAM(s) IV Push every 4 hours PRN Breakthrough pain  magnesium hydroxide Suspension 30 milliLiter(s) Oral every 12 hours PRN Constipation  ondansetron Injectable 4 milliGRAM(s) IV Push every 6 hours PRN Nausea  oxyCODONE    IR 10 milliGRAM(s) Oral every 3 hours PRN mild to moderate  oxyCODONE    IR 15 milliGRAM(s) Oral every 4 hours PRN Severe Pain (7 - 10)  saline laxative (FLEET) Rectal Enema 1 Enema Rectal once PRN constipation      PAST MEDICAL & SURGICAL HISTORY:  DM (diabetes mellitus)  HLD (hyperlipidemia)  HTN (hypertension)  History of tonsillectomy  History of inguinal hernia repair, bilateral  [x ] Reviewed     SOCIAL HISTORY:  [x] Substance abuse: None  [x] Alcohol use: Occasionally drinks wine  [x] Tobacco: Occasionally smokes cigars    FAMILY HISTORY:  Family history of diabetes mellitus (DM)  [x] No pertinent family history in first degree relatives     REVIEW OF SYSTEMS:  [x] All other ROS negative  [  ] Unable to obtain due to poor mental status    Vital Signs Last 24 Hrs  T(C): 36.6 (15 May 2019 12:44), Max: 37 (14 May 2019 21:29)  T(F): 97.9 (15 May 2019 12:44), Max: 98.6 (14 May 2019 21:29)  HR: 105 (15 May 2019 12:44) (99 - 110)  BP: 149/87 (15 May 2019 12:44) (148/78 - 171/84)  BP(mean): --  RR: 15 (15 May 2019 12:44) (15 - 18)  SpO2: 96% (15 May 2019 12:44) (94% - 96%)    PHYSICAL EXAM:  GENERAL: Morbidly obese, drowsy, NAD  ENMT: Moist mucous membranes  RESPIRATORY: Clear to auscultation bilaterally; No rales, rhonchi, wheezing, or rubs  CARDIOVASCULAR: Regular rate and rhythm; No murmurs, rubs, or gallops  GASTROINTESTINAL: Protuberant, Soft, Nontender, Nondistended; Bowel sounds present  BACK: Hemovac drain in place, draining serosanguinous fluid  EXTREMITIES:  2+ Peripheral Pulses, No clubbing, cyanosis, or edema  NERVOUS SYSTEM:  Alert & Oriented X3; Moving all 4 extremities; No gross sensory deficits  SKIN: No rashes or lesions; Incisions C/D/I      LABS:                        12.4   10.06 )-----------( 283      ( 15 May 2019 05:30 )             38.1     Hemoglobin: 12.4 g/dL (05-15 @ 05:30)  Hemoglobin: 11.7 g/dL (05-14 @ 12:00)    05-15    141  |  104  |  13  ----------------------------<  173<H>  4.2   |  25  |  1.04    Ca    9.8      15 May 2019 05:30                  [ ] Consultant(s) Notes Reviewed  [x] Care Discussed with Consultants/Other Providers: Ortho PA - discussed

## 2019-05-15 NOTE — DISCHARGE NOTE NURSING/CASE MANAGEMENT/SOCIAL WORK - NSDCPNINST_GEN_ALL_CORE
Maintain back incision and dressing clean dry and intact. Call MD with any signs of infection ie fever, redness, drainage, or with signs of bleeding, unrelieved increased pain, or persistent nausea. Avoid twisting motion and remember to logroll to turn in bed. Continue to drink plenty of fluids. Avoid strenuous activity and constipation which may be a side effect from taking certain medications and narcotics. No heavy lifting greater than 10 pounds, ie. a gallon of milk. Safety and fall prevention measures reinforced. Continue Pre-Diabetes management, diet and glucose monitoring, know your A1C blood level and follow up with PMD for continuity of care. Remember hand hygiene, skin inspection for prevention of infection. Follow-up with MD in office as instructed.

## 2019-05-15 NOTE — OCCUPATIONAL THERAPY INITIAL EVALUATION ADULT - PHYSICAL ASSIST/NONPHYSICAL ASSIST: SIT/SUPINE, REHAB EVAL
using the log-rolling technique to adhere to spinal precautions/2 person assist/verbal cues/set-up required

## 2019-05-15 NOTE — CONSULT NOTE ADULT - ASSESSMENT
Mr. Franklin is a 72 yo man with hx of morbid obesity, HTN, HLD, and DM Type II admitted for elective Lumbar Laminectomy for L2-5 spinal stenosis and L4/5 spondylolisthesis done on 5/14/19, now POD#1 Mr. Franklin is a 74 yo man with hx of morbid obesity, HTN, HLD, and DM Type II admitted for elective  L2-5 lami/L4/5 in situ fusion for L2-5 spinal stenosis and L4/5 spondylolisthesis done on 5/14/19.

## 2019-05-15 NOTE — DISCHARGE NOTE NURSING/CASE MANAGEMENT/SOCIAL WORK - NSDPDISTO_GEN_ALL_CORE
Home stable, lumbar dressing in place clean dry and intact, tolerating diet, voiding well, oob with brace and walker with 1 person assist/Home with home care

## 2019-05-16 LAB
ANION GAP SERPL CALC-SCNC: 11 MMO/L — SIGNIFICANT CHANGE UP (ref 7–14)
BASOPHILS # BLD AUTO: 0.07 K/UL — SIGNIFICANT CHANGE UP (ref 0–0.2)
BASOPHILS NFR BLD AUTO: 0.8 % — SIGNIFICANT CHANGE UP (ref 0–2)
BUN SERPL-MCNC: 11 MG/DL — SIGNIFICANT CHANGE UP (ref 7–23)
CALCIUM SERPL-MCNC: 9.4 MG/DL — SIGNIFICANT CHANGE UP (ref 8.4–10.5)
CHLORIDE SERPL-SCNC: 99 MMOL/L — SIGNIFICANT CHANGE UP (ref 98–107)
CO2 SERPL-SCNC: 24 MMOL/L — SIGNIFICANT CHANGE UP (ref 22–31)
CREAT SERPL-MCNC: 0.91 MG/DL — SIGNIFICANT CHANGE UP (ref 0.5–1.3)
EOSINOPHIL # BLD AUTO: 0.08 K/UL — SIGNIFICANT CHANGE UP (ref 0–0.5)
EOSINOPHIL NFR BLD AUTO: 0.9 % — SIGNIFICANT CHANGE UP (ref 0–6)
GLUCOSE BLDC GLUCOMTR-MCNC: 157 MG/DL — HIGH (ref 70–99)
GLUCOSE BLDC GLUCOMTR-MCNC: 170 MG/DL — HIGH (ref 70–99)
GLUCOSE BLDC GLUCOMTR-MCNC: 188 MG/DL — HIGH (ref 70–99)
GLUCOSE BLDC GLUCOMTR-MCNC: 296 MG/DL — HIGH (ref 70–99)
GLUCOSE SERPL-MCNC: 284 MG/DL — HIGH (ref 70–99)
HCT VFR BLD CALC: 34.7 % — LOW (ref 39–50)
HGB BLD-MCNC: 11.3 G/DL — LOW (ref 13–17)
IMM GRANULOCYTES NFR BLD AUTO: 0.8 % — SIGNIFICANT CHANGE UP (ref 0–1.5)
LYMPHOCYTES # BLD AUTO: 1.39 K/UL — SIGNIFICANT CHANGE UP (ref 1–3.3)
LYMPHOCYTES # BLD AUTO: 15 % — SIGNIFICANT CHANGE UP (ref 13–44)
MCHC RBC-ENTMCNC: 30.2 PG — SIGNIFICANT CHANGE UP (ref 27–34)
MCHC RBC-ENTMCNC: 32.6 % — SIGNIFICANT CHANGE UP (ref 32–36)
MCV RBC AUTO: 92.8 FL — SIGNIFICANT CHANGE UP (ref 80–100)
MONOCYTES # BLD AUTO: 0.85 K/UL — SIGNIFICANT CHANGE UP (ref 0–0.9)
MONOCYTES NFR BLD AUTO: 9.2 % — SIGNIFICANT CHANGE UP (ref 2–14)
NEUTROPHILS # BLD AUTO: 6.8 K/UL — SIGNIFICANT CHANGE UP (ref 1.8–7.4)
NEUTROPHILS NFR BLD AUTO: 73.3 % — SIGNIFICANT CHANGE UP (ref 43–77)
NRBC # FLD: 0 K/UL — SIGNIFICANT CHANGE UP (ref 0–0)
PLATELET # BLD AUTO: 269 K/UL — SIGNIFICANT CHANGE UP (ref 150–400)
PMV BLD: 9.3 FL — SIGNIFICANT CHANGE UP (ref 7–13)
POTASSIUM SERPL-MCNC: 3.8 MMOL/L — SIGNIFICANT CHANGE UP (ref 3.5–5.3)
POTASSIUM SERPL-SCNC: 3.8 MMOL/L — SIGNIFICANT CHANGE UP (ref 3.5–5.3)
RBC # BLD: 3.74 M/UL — LOW (ref 4.2–5.8)
RBC # FLD: 14.5 % — SIGNIFICANT CHANGE UP (ref 10.3–14.5)
SODIUM SERPL-SCNC: 134 MMOL/L — LOW (ref 135–145)
WBC # BLD: 9.26 K/UL — SIGNIFICANT CHANGE UP (ref 3.8–10.5)
WBC # FLD AUTO: 9.26 K/UL — SIGNIFICANT CHANGE UP (ref 3.8–10.5)

## 2019-05-16 PROCEDURE — 99233 SBSQ HOSP IP/OBS HIGH 50: CPT

## 2019-05-16 RX ORDER — DIAZEPAM 5 MG
2 TABLET ORAL EVERY 8 HOURS
Refills: 0 | Status: DISCONTINUED | OUTPATIENT
Start: 2019-05-16 | End: 2019-05-17

## 2019-05-16 RX ORDER — ISOSORBIDE MONONITRATE 60 MG/1
30 TABLET, EXTENDED RELEASE ORAL DAILY
Refills: 0 | Status: DISCONTINUED | OUTPATIENT
Start: 2019-05-16 | End: 2019-05-16

## 2019-05-16 RX ORDER — HYDRALAZINE HCL 50 MG
5 TABLET ORAL ONCE
Refills: 0 | Status: COMPLETED | OUTPATIENT
Start: 2019-05-16 | End: 2019-05-16

## 2019-05-16 RX ORDER — ACETAMINOPHEN 500 MG
1000 TABLET ORAL ONCE
Refills: 0 | Status: COMPLETED | OUTPATIENT
Start: 2019-05-16 | End: 2019-05-16

## 2019-05-16 RX ORDER — HYDRALAZINE HCL 50 MG
10 TABLET ORAL ONCE
Refills: 0 | Status: COMPLETED | OUTPATIENT
Start: 2019-05-16 | End: 2019-05-16

## 2019-05-16 RX ORDER — TRAMADOL HYDROCHLORIDE 50 MG/1
50 TABLET ORAL EVERY 8 HOURS
Refills: 0 | Status: DISCONTINUED | OUTPATIENT
Start: 2019-05-16 | End: 2019-05-17

## 2019-05-16 RX ORDER — TRAMADOL HYDROCHLORIDE 50 MG/1
100 TABLET ORAL EVERY 8 HOURS
Refills: 0 | Status: DISCONTINUED | OUTPATIENT
Start: 2019-05-16 | End: 2019-05-17

## 2019-05-16 RX ADMIN — Medication 5 MILLIGRAM(S): at 06:59

## 2019-05-16 RX ADMIN — Medication 1: at 12:18

## 2019-05-16 RX ADMIN — Medication 400 MILLIGRAM(S): at 15:05

## 2019-05-16 RX ADMIN — Medication 1: at 17:24

## 2019-05-16 RX ADMIN — Medication 5 MILLIGRAM(S): at 09:28

## 2019-05-16 RX ADMIN — Medication 3: at 07:57

## 2019-05-16 RX ADMIN — Medication 100 MILLIGRAM(S): at 14:28

## 2019-05-16 RX ADMIN — ISOSORBIDE MONONITRATE 30 MILLIGRAM(S): 60 TABLET, EXTENDED RELEASE ORAL at 12:18

## 2019-05-16 RX ADMIN — Medication 10 MILLIGRAM(S): at 22:50

## 2019-05-16 RX ADMIN — TRAMADOL HYDROCHLORIDE 50 MILLIGRAM(S): 50 TABLET ORAL at 12:18

## 2019-05-16 RX ADMIN — TRAMADOL HYDROCHLORIDE 50 MILLIGRAM(S): 50 TABLET ORAL at 13:05

## 2019-05-16 RX ADMIN — Medication 400 MILLIGRAM(S): at 22:50

## 2019-05-16 RX ADMIN — Medication 1 ENEMA: at 13:19

## 2019-05-16 RX ADMIN — Medication 400 MILLIGRAM(S): at 06:59

## 2019-05-16 RX ADMIN — Medication 100 MILLIGRAM(S): at 21:19

## 2019-05-16 RX ADMIN — Medication 145 MILLIGRAM(S): at 12:18

## 2019-05-16 RX ADMIN — SIMVASTATIN 20 MILLIGRAM(S): 20 TABLET, FILM COATED ORAL at 21:19

## 2019-05-16 NOTE — PROGRESS NOTE ADULT - SUBJECTIVE AND OBJECTIVE BOX
Patient seen and examined at the bedside with wife present.  Incisional pain is controlled.  He has been out of bed to chair, ambulation 50 feet, and has met functional goals with physical therapy.  Denies numbness/tingling/paresthesias/weakness.  Denies headaches.   Denies fevers/chills/CP,SOB.      HEALTH ISSUES - PROBLEM Dx:  Essential hypertension: Essential hypertension  Postoperative pain after spinal surgery: Postoperative pain after spinal surgery  Snores  DM (diabetes mellitus)  HTN (hypertension)  Lumbar spinal stenosis          MEDICATIONS  (STANDING):  acetaminophen  IVPB .. 1000 milliGRAM(s) IV Intermittent once  acetaminophen  IVPB .. 1000 milliGRAM(s) IV Intermittent once  dextrose 5%. 1000 milliLiter(s) IV Continuous <Continuous>  dextrose 50% Injectable 12.5 Gram(s) IV Push once  dextrose 50% Injectable 25 Gram(s) IV Push once  dextrose 50% Injectable 25 Gram(s) IV Push once  docusate sodium 100 milliGRAM(s) Oral three times a day  enalapril 5 milliGRAM(s) Oral daily  fenofibrate Tablet 145 milliGRAM(s) Oral daily  insulin lispro (HumaLOG) corrective regimen sliding scale   SubCutaneous three times a day before meals  insulin lispro (HumaLOG) corrective regimen sliding scale   SubCutaneous at bedtime  isosorbide   mononitrate ER Tablet (IMDUR) 30 milliGRAM(s) Oral daily  lactated ringers. 1000 milliLiter(s) IV Continuous <Continuous>  lactated ringers. 1000 milliLiter(s) IV Continuous <Continuous>  multivitamin 1 Tablet(s) Oral daily  pantoprazole    Tablet 40 milliGRAM(s) Oral before breakfast  polyethylene glycol 3350 17 Gram(s) Oral daily  senna 2 Tablet(s) Oral at bedtime  simvastatin 20 milliGRAM(s) Oral at bedtime      Allergies    No Known Allergies    Intolerances    Jardiance (Other; Nausea)  Victoza (Other; Nausea)      PAST MEDICAL & SURGICAL HISTORY:  DM (diabetes mellitus)  HLD (hyperlipidemia)  HTN (hypertension)  History of tonsillectomy  History of inguinal hernia repair, bilateral                            11.3   9.26  )-----------( 269      ( 16 May 2019 05:26 )             34.7       16 May 2019 05:26    134    |  99     |  11     ----------------------------<  284    3.8     |  24     |  0.91     Ca    9.4        16 May 2019 05:26                Vital Signs Last 24 Hrs  T(C): 37.1 (05-16-19 @ 14:22), Max: 37.1 (05-16-19 @ 07:05)  T(F): 98.8 (05-16-19 @ 14:22), Max: 98.8 (05-16-19 @ 14:22)  HR: 98 (05-16-19 @ 14:22) (94 - 107)  BP: 146/83 (05-16-19 @ 14:22) (146/83 - 188/114)  BP(mean): --  RR: 16 (05-16-19 @ 14:22) (14 - 16)  SpO2: 95% (05-16-19 @ 14:22) (94% - 97%)    Gen: NAD  Dressing clean dry intact  HV (sewn) 35/100/220    Motor:               L2             L3             L4               L5            S1  R         5/5           5/5          5/5             5/5           5/5  L          5/5          5/5           5/5             5/5           5/5    Sensory:             L2          L3         L4      L5       S1         (0=absent, 1=impaired, 2=normal, NT=not testable)  R         2            2            2        2        2  L          2            2           2        2         2      A/P: 72 y/o M s/p L2-L5 Laminectomy, L4-L5 In situ fusion.  The patient is neurologically stable with intact motor and sensation.  No radicular symptoms. Cleared PT.   Pain control  Neuro checks as per routine  Cont HV drain  WBAT/PT/OT/OOB  LSO with ambulation  FU Labs  PPI, bowel regimen  Inc spirometry  SCDs  Medical co-management appreciated  Dispo - home 5/17

## 2019-05-16 NOTE — PROGRESS NOTE ADULT - SUBJECTIVE AND OBJECTIVE BOX
Patient seen and examined; pain controlled, some back pain; RLE symptoms resolved from preop; no acute overnight events    Vital Signs Last 24 Hrs  T(C): 36.7 (16 May 2019 01:38), Max: 36.8 (15 May 2019 17:18)  T(F): 98.1 (16 May 2019 01:38), Max: 98.3 (15 May 2019 17:18)  HR: 97 (16 May 2019 01:38) (94 - 105)  BP: 168/86 (16 May 2019 01:38) (149/87 - 169/90)  BP(mean): --  RR: 16 (16 May 2019 01:38) (15 - 16)  SpO2: 95% (16 May 2019 01:38) (94% - 97%)                          11.3   9.26  )-----------( 269      ( 16 May 2019 05:26 )             34.7     05-16    134<L>  |  99  |  11  ----------------------------<  284<H>  3.8   |  24  |  0.91    Ca    9.4      16 May 2019 05:26      MEDICATIONS  (STANDING):  acetaminophen  IVPB .. 1000 milliGRAM(s) IV Intermittent once  acetaminophen  IVPB .. 1000 milliGRAM(s) IV Intermittent once  acetaminophen  IVPB .. 1000 milliGRAM(s) IV Intermittent once  dextrose 5%. 1000 milliLiter(s) (50 mL/Hr) IV Continuous <Continuous>  dextrose 50% Injectable 12.5 Gram(s) IV Push once  dextrose 50% Injectable 25 Gram(s) IV Push once  dextrose 50% Injectable 25 Gram(s) IV Push once  docusate sodium 100 milliGRAM(s) Oral three times a day  enalapril 5 milliGRAM(s) Oral daily  fenofibrate Tablet 145 milliGRAM(s) Oral daily  insulin lispro (HumaLOG) corrective regimen sliding scale   SubCutaneous three times a day before meals  insulin lispro (HumaLOG) corrective regimen sliding scale   SubCutaneous at bedtime  isosorbide   mononitrate ER Tablet (IMDUR) 30 milliGRAM(s) Oral daily  lactated ringers. 1000 milliLiter(s) (30 mL/Hr) IV Continuous <Continuous>  lactated ringers. 1000 milliLiter(s) (100 mL/Hr) IV Continuous <Continuous>  multivitamin 1 Tablet(s) Oral daily  pantoprazole    Tablet 40 milliGRAM(s) Oral before breakfast  polyethylene glycol 3350 17 Gram(s) Oral daily  senna 2 Tablet(s) Oral at bedtime  simvastatin 20 milliGRAM(s) Oral at bedtime    MEDICATIONS  (PRN):  benzocaine 15 mG/menthol 3.6 mG Lozenge 1 Lozenge Oral five times a day PRN Sore Throat  dextrose 40% Gel 15 Gram(s) Oral once PRN Blood Glucose LESS THAN 70 milliGRAM(s)/deciliter  diazepam    Tablet 2 milliGRAM(s) Oral every 8 hours PRN Spasm  glucagon  Injectable 1 milliGRAM(s) IntraMuscular once PRN Glucose LESS THAN 70 milligrams/deciliter  HYDROmorphone  Injectable 0.5 milliGRAM(s) IV Push every 4 hours PRN Breakthrough pain  magnesium hydroxide Suspension 30 milliLiter(s) Oral every 12 hours PRN Constipation  ondansetron Injectable 4 milliGRAM(s) IV Push every 6 hours PRN Nausea  oxyCODONE    IR 5 milliGRAM(s) Oral every 4 hours PRN mild to moderate pain  oxyCODONE    IR 10 milliGRAM(s) Oral every 4 hours PRN Severe Pain (7 - 10)  saline laxative (FLEET) Rectal Enema 1 Enema Rectal once PRN constipation    NAD  Lumbar dressing c/d/i  HV in place with good seal -> 100/100  5/5 BL IP/Q/H/TA/GS/EHL/FHL  SILLUCIO DIXONP

## 2019-05-16 NOTE — PROGRESS NOTE ADULT - SUBJECTIVE AND OBJECTIVE BOX
Patient is a 73y old  Male who presents with a chief complaint of s/p L2-L5 Laminectomy, L4-L5 In situ fusion (16 May 2019 14:36)        SUBJECTIVE / OVERNIGHT EVENTS:      MEDICATIONS  (STANDING):  acetaminophen  IVPB .. 1000 milliGRAM(s) IV Intermittent once  dextrose 5%. 1000 milliLiter(s) (50 mL/Hr) IV Continuous <Continuous>  dextrose 50% Injectable 12.5 Gram(s) IV Push once  dextrose 50% Injectable 25 Gram(s) IV Push once  dextrose 50% Injectable 25 Gram(s) IV Push once  docusate sodium 100 milliGRAM(s) Oral three times a day  enalapril 5 milliGRAM(s) Oral daily  fenofibrate Tablet 145 milliGRAM(s) Oral daily  insulin lispro (HumaLOG) corrective regimen sliding scale   SubCutaneous three times a day before meals  insulin lispro (HumaLOG) corrective regimen sliding scale   SubCutaneous at bedtime  isosorbide   mononitrate ER Tablet (IMDUR) 30 milliGRAM(s) Oral daily  lactated ringers. 1000 milliLiter(s) (30 mL/Hr) IV Continuous <Continuous>  lactated ringers. 1000 milliLiter(s) (100 mL/Hr) IV Continuous <Continuous>  multivitamin 1 Tablet(s) Oral daily  pantoprazole    Tablet 40 milliGRAM(s) Oral before breakfast  polyethylene glycol 3350 17 Gram(s) Oral daily  senna 2 Tablet(s) Oral at bedtime  simvastatin 20 milliGRAM(s) Oral at bedtime    MEDICATIONS  (PRN):  benzocaine 15 mG/menthol 3.6 mG Lozenge 1 Lozenge Oral five times a day PRN Sore Throat  dextrose 40% Gel 15 Gram(s) Oral once PRN Blood Glucose LESS THAN 70 milliGRAM(s)/deciliter  diazepam    Tablet 2 milliGRAM(s) Oral every 8 hours PRN Spasm  glucagon  Injectable 1 milliGRAM(s) IntraMuscular once PRN Glucose LESS THAN 70 milligrams/deciliter  HYDROmorphone  Injectable 0.5 milliGRAM(s) IV Push every 4 hours PRN Breakthrough pain  magnesium hydroxide Suspension 30 milliLiter(s) Oral every 12 hours PRN Constipation  ondansetron Injectable 4 milliGRAM(s) IV Push every 6 hours PRN Nausea  traMADol 50 milliGRAM(s) Oral every 8 hours PRN mild to moderate pain  traMADol 100 milliGRAM(s) Oral every 8 hours PRN Severe Pain (7 - 10)      Vital Signs Last 24 Hrs  T(C): 36.7 (16 May 2019 17:28), Max: 37.1 (16 May 2019 07:05)  T(F): 98 (16 May 2019 17:28), Max: 98.8 (16 May 2019 14:22)  HR: 88 (16 May 2019 17:28) (88 - 107)  BP: 165/87 (16 May 2019 17:28) (146/83 - 188/114)  BP(mean): --  RR: 16 (16 May 2019 17:28) (14 - 16)  SpO2: 95% (16 May 2019 17:28) (94% - 97%)  CAPILLARY BLOOD GLUCOSE      POCT Blood Glucose.: 170 mg/dL (16 May 2019 17:20)  POCT Blood Glucose.: 188 mg/dL (16 May 2019 12:06)  POCT Blood Glucose.: 296 mg/dL (16 May 2019 07:54)  POCT Blood Glucose.: 204 mg/dL (15 May 2019 22:06)    I&O's Summary    15 May 2019 07:01  -  16 May 2019 07:00  --------------------------------------------------------  IN: 0 mL / OUT: 400 mL / NET: -400 mL    16 May 2019 07:01  -  16 May 2019 18:36  --------------------------------------------------------  IN: 0 mL / OUT: 35 mL / NET: -35 mL          PHYSICAL EXAM  GENERAL: NAD, well-developed  HEAD:  Atraumatic, Normocephalic  EYES: EOMI, PERRLA, conjunctiva and sclera clear  NECK: Supple, No JVD  CHEST/LUNG: Clear to auscultation bilaterally; No wheeze  HEART: Regular rate and rhythm; No murmurs, rubs, or gallops  ABDOMEN: Soft, Nontender, Nondistended; Bowel sounds present  EXTREMITIES:  2+ Peripheral Pulses, No clubbing, cyanosis, or edema  PSYCH: AAOx3  SKIN: No rashes or lesions    LABS:                        11.3   9.26  )-----------( 269      ( 16 May 2019 05:26 )             34.7     05-16    134<L>  |  99  |  11  ----------------------------<  284<H>  3.8   |  24  |  0.91    Ca    9.4      16 May 2019 05:26                RADIOLOGY & ADDITIONAL TESTS:    Imaging Personally Reviewed:  Consultant(s) Notes Reviewed:    Care Discussed with Consultants/Other Providers: CHIEF COMPLAINT: f/u     SUBJECTIVE / OVERNIGHT EVENTS:   Patient seen and examined. No acute events overnight. Pain better controlled today. Patient states pain 5 on the pain scale at the surgical site in his back. Patient with decreased appetite and oral intake. Patient admits to feeling constipated. Patient w/o chest pain, SOB, n/v or abdominal pain. Patient tolerated PT today.    MEDICATIONS  (STANDING):  acetaminophen  IVPB .. 1000 milliGRAM(s) IV Intermittent once  dextrose 5%. 1000 milliLiter(s) (50 mL/Hr) IV Continuous <Continuous>  dextrose 50% Injectable 12.5 Gram(s) IV Push once  dextrose 50% Injectable 25 Gram(s) IV Push once  dextrose 50% Injectable 25 Gram(s) IV Push once  docusate sodium 100 milliGRAM(s) Oral three times a day  enalapril 5 milliGRAM(s) Oral daily  fenofibrate Tablet 145 milliGRAM(s) Oral daily  insulin lispro (HumaLOG) corrective regimen sliding scale   SubCutaneous three times a day before meals  insulin lispro (HumaLOG) corrective regimen sliding scale   SubCutaneous at bedtime  isosorbide   mononitrate ER Tablet (IMDUR) 30 milliGRAM(s) Oral daily  lactated ringers. 1000 milliLiter(s) (30 mL/Hr) IV Continuous <Continuous>  lactated ringers. 1000 milliLiter(s) (100 mL/Hr) IV Continuous <Continuous>  multivitamin 1 Tablet(s) Oral daily  pantoprazole    Tablet 40 milliGRAM(s) Oral before breakfast  polyethylene glycol 3350 17 Gram(s) Oral daily  senna 2 Tablet(s) Oral at bedtime  simvastatin 20 milliGRAM(s) Oral at bedtime    MEDICATIONS  (PRN):  benzocaine 15 mG/menthol 3.6 mG Lozenge 1 Lozenge Oral five times a day PRN Sore Throat  dextrose 40% Gel 15 Gram(s) Oral once PRN Blood Glucose LESS THAN 70 milliGRAM(s)/deciliter  diazepam    Tablet 2 milliGRAM(s) Oral every 8 hours PRN Spasm  glucagon  Injectable 1 milliGRAM(s) IntraMuscular once PRN Glucose LESS THAN 70 milligrams/deciliter  HYDROmorphone  Injectable 0.5 milliGRAM(s) IV Push every 4 hours PRN Breakthrough pain  magnesium hydroxide Suspension 30 milliLiter(s) Oral every 12 hours PRN Constipation  ondansetron Injectable 4 milliGRAM(s) IV Push every 6 hours PRN Nausea  traMADol 50 milliGRAM(s) Oral every 8 hours PRN mild to moderate pain  traMADol 100 milliGRAM(s) Oral every 8 hours PRN Severe Pain (7 - 10)      VITALS:  T(F): 98 (05-16-19 @ 17:28), Max: 98.8 (05-16-19 @ 14:22)  HR: 88 (05-16-19 @ 17:28) (88 - 107)  BP: 165/87 (05-16-19 @ 17:28) (146/83 - 188/114)  RR: 16 (05-16-19 @ 17:28) (14 - 16)  SpO2: 95% (05-16-19 @ 17:28)      PHYSICAL EXAM:  GENERAL: Morbidly obese, in NAD  RESPIRATORY: Clear to auscultation bilaterally; No rales, rhonchi, wheezing, or rubs  CARDIOVASCULAR: Regular rate and rhythm; No murmurs, rubs, or gallops  GASTROINTESTINAL: Protuberant, Soft, Nontender, Nondistended; Bowel sounds present  BACK: Hemovac drain in place, draining serosanguinous fluid  EXTREMITIES:  2+ Peripheral Pulses, No clubbing, cyanosis, or edema  NERVOUS SYSTEM:  Alert & Oriented X3; Moving all 4 extremities; No gross sensory deficits      LABS:              11.3                 134  | 24   | 11           9.26  >-----------< 269     ------------------------< 284                   34.7                 3.8  | 99   | 0.91                                         Ca 9.4   Mg x     Ph x              [ ] Consultant(s) Notes Reviewed:  [x] Care Discussed with Consultants/Other Providers: Orthopedic PA - discussed

## 2019-05-17 ENCOUNTER — TRANSCRIPTION ENCOUNTER (OUTPATIENT)
Age: 74
End: 2019-05-17

## 2019-05-17 ENCOUNTER — INBOUND DOCUMENT (OUTPATIENT)
Age: 74
End: 2019-05-17

## 2019-05-17 VITALS
HEART RATE: 99 BPM | OXYGEN SATURATION: 96 % | SYSTOLIC BLOOD PRESSURE: 157 MMHG | RESPIRATION RATE: 16 BRPM | DIASTOLIC BLOOD PRESSURE: 85 MMHG | TEMPERATURE: 99 F

## 2019-05-17 PROBLEM — E78.5 HYPERLIPIDEMIA, UNSPECIFIED: Chronic | Status: ACTIVE | Noted: 2019-05-02

## 2019-05-17 PROBLEM — I10 ESSENTIAL (PRIMARY) HYPERTENSION: Chronic | Status: ACTIVE | Noted: 2019-05-02

## 2019-05-17 PROBLEM — E11.9 TYPE 2 DIABETES MELLITUS WITHOUT COMPLICATIONS: Chronic | Status: ACTIVE | Noted: 2019-05-02

## 2019-05-17 LAB
ANION GAP SERPL CALC-SCNC: 14 MMO/L — SIGNIFICANT CHANGE UP (ref 7–14)
BASOPHILS # BLD AUTO: 0.08 K/UL — SIGNIFICANT CHANGE UP (ref 0–0.2)
BASOPHILS NFR BLD AUTO: 1 % — SIGNIFICANT CHANGE UP (ref 0–2)
BUN SERPL-MCNC: 10 MG/DL — SIGNIFICANT CHANGE UP (ref 7–23)
CALCIUM SERPL-MCNC: 9.4 MG/DL — SIGNIFICANT CHANGE UP (ref 8.4–10.5)
CHLORIDE SERPL-SCNC: 99 MMOL/L — SIGNIFICANT CHANGE UP (ref 98–107)
CO2 SERPL-SCNC: 23 MMOL/L — SIGNIFICANT CHANGE UP (ref 22–31)
CREAT SERPL-MCNC: 0.82 MG/DL — SIGNIFICANT CHANGE UP (ref 0.5–1.3)
EOSINOPHIL # BLD AUTO: 0.17 K/UL — SIGNIFICANT CHANGE UP (ref 0–0.5)
EOSINOPHIL NFR BLD AUTO: 2.1 % — SIGNIFICANT CHANGE UP (ref 0–6)
GLUCOSE BLDC GLUCOMTR-MCNC: 157 MG/DL — HIGH (ref 70–99)
GLUCOSE BLDC GLUCOMTR-MCNC: 166 MG/DL — HIGH (ref 70–99)
GLUCOSE SERPL-MCNC: 162 MG/DL — HIGH (ref 70–99)
HCT VFR BLD CALC: 35.5 % — LOW (ref 39–50)
HGB BLD-MCNC: 11.6 G/DL — LOW (ref 13–17)
IMM GRANULOCYTES NFR BLD AUTO: 0.7 % — SIGNIFICANT CHANGE UP (ref 0–1.5)
LYMPHOCYTES # BLD AUTO: 1.35 K/UL — SIGNIFICANT CHANGE UP (ref 1–3.3)
LYMPHOCYTES # BLD AUTO: 16.8 % — SIGNIFICANT CHANGE UP (ref 13–44)
MCHC RBC-ENTMCNC: 30.4 PG — SIGNIFICANT CHANGE UP (ref 27–34)
MCHC RBC-ENTMCNC: 32.7 % — SIGNIFICANT CHANGE UP (ref 32–36)
MCV RBC AUTO: 92.9 FL — SIGNIFICANT CHANGE UP (ref 80–100)
MONOCYTES # BLD AUTO: 0.74 K/UL — SIGNIFICANT CHANGE UP (ref 0–0.9)
MONOCYTES NFR BLD AUTO: 9.2 % — SIGNIFICANT CHANGE UP (ref 2–14)
NEUTROPHILS # BLD AUTO: 5.62 K/UL — SIGNIFICANT CHANGE UP (ref 1.8–7.4)
NEUTROPHILS NFR BLD AUTO: 70.2 % — SIGNIFICANT CHANGE UP (ref 43–77)
NRBC # FLD: 0 K/UL — SIGNIFICANT CHANGE UP (ref 0–0)
PLATELET # BLD AUTO: 250 K/UL — SIGNIFICANT CHANGE UP (ref 150–400)
PMV BLD: 9 FL — SIGNIFICANT CHANGE UP (ref 7–13)
POTASSIUM SERPL-MCNC: 3.8 MMOL/L — SIGNIFICANT CHANGE UP (ref 3.5–5.3)
POTASSIUM SERPL-SCNC: 3.8 MMOL/L — SIGNIFICANT CHANGE UP (ref 3.5–5.3)
RBC # BLD: 3.82 M/UL — LOW (ref 4.2–5.8)
RBC # FLD: 14.1 % — SIGNIFICANT CHANGE UP (ref 10.3–14.5)
SODIUM SERPL-SCNC: 136 MMOL/L — SIGNIFICANT CHANGE UP (ref 135–145)
WBC # BLD: 8.02 K/UL — SIGNIFICANT CHANGE UP (ref 3.8–10.5)
WBC # FLD AUTO: 8.02 K/UL — SIGNIFICANT CHANGE UP (ref 3.8–10.5)

## 2019-05-17 PROCEDURE — 99233 SBSQ HOSP IP/OBS HIGH 50: CPT

## 2019-05-17 RX ORDER — TRAMADOL HYDROCHLORIDE 50 MG/1
1 TABLET ORAL
Qty: 21 | Refills: 0
Start: 2019-05-17 | End: 2019-05-23

## 2019-05-17 RX ORDER — SENNA PLUS 8.6 MG/1
2 TABLET ORAL
Qty: 0 | Refills: 0 | DISCHARGE
Start: 2019-05-17

## 2019-05-17 RX ORDER — DOCUSATE SODIUM 100 MG
1 CAPSULE ORAL
Qty: 0 | Refills: 0 | DISCHARGE
Start: 2019-05-17

## 2019-05-17 RX ORDER — ACETAMINOPHEN 500 MG
2 TABLET ORAL
Qty: 0 | Refills: 0 | DISCHARGE
Start: 2019-05-17

## 2019-05-17 RX ORDER — ACETAMINOPHEN 500 MG
650 TABLET ORAL EVERY 6 HOURS
Refills: 0 | Status: DISCONTINUED | OUTPATIENT
Start: 2019-05-17 | End: 2019-05-17

## 2019-05-17 RX ADMIN — Medication 650 MILLIGRAM(S): at 12:22

## 2019-05-17 RX ADMIN — TRAMADOL HYDROCHLORIDE 50 MILLIGRAM(S): 50 TABLET ORAL at 06:05

## 2019-05-17 RX ADMIN — Medication 145 MILLIGRAM(S): at 12:22

## 2019-05-17 RX ADMIN — TRAMADOL HYDROCHLORIDE 50 MILLIGRAM(S): 50 TABLET ORAL at 05:43

## 2019-05-17 RX ADMIN — Medication 100 MILLIGRAM(S): at 13:55

## 2019-05-17 RX ADMIN — Medication 5 MILLIGRAM(S): at 05:43

## 2019-05-17 RX ADMIN — PANTOPRAZOLE SODIUM 40 MILLIGRAM(S): 20 TABLET, DELAYED RELEASE ORAL at 05:43

## 2019-05-17 RX ADMIN — Medication 1: at 12:12

## 2019-05-17 RX ADMIN — Medication 100 MILLIGRAM(S): at 05:43

## 2019-05-17 RX ADMIN — ISOSORBIDE MONONITRATE 30 MILLIGRAM(S): 60 TABLET, EXTENDED RELEASE ORAL at 13:55

## 2019-05-17 NOTE — PROGRESS NOTE ADULT - SUBJECTIVE AND OBJECTIVE BOX
CHIEF COMPLAINT: f/u     SUBJECTIVE / OVERNIGHT EVENTS:   Patient seen and examined. No acute events overnight. Patient's wife present at bedside. Patient's pain continues to improve each day - tolerating tramadol well without oversedation. Patient had BM yesterday after fleet enema. Patient w/o chest pain, SOB, n/v or abdominal pain.      MEDICATIONS  (STANDING):  acetaminophen   Tablet .. 650 milliGRAM(s) Oral every 6 hours  dextrose 5%. 1000 milliLiter(s) (50 mL/Hr) IV Continuous <Continuous>  dextrose 50% Injectable 12.5 Gram(s) IV Push once  dextrose 50% Injectable 25 Gram(s) IV Push once  dextrose 50% Injectable 25 Gram(s) IV Push once  docusate sodium 100 milliGRAM(s) Oral three times a day  enalapril 5 milliGRAM(s) Oral daily  fenofibrate Tablet 145 milliGRAM(s) Oral daily  insulin lispro (HumaLOG) corrective regimen sliding scale   SubCutaneous three times a day before meals  insulin lispro (HumaLOG) corrective regimen sliding scale   SubCutaneous at bedtime  isosorbide   mononitrate ER Tablet (IMDUR) 30 milliGRAM(s) Oral daily  lactated ringers. 1000 milliLiter(s) (30 mL/Hr) IV Continuous <Continuous>  lactated ringers. 1000 milliLiter(s) (100 mL/Hr) IV Continuous <Continuous>  multivitamin 1 Tablet(s) Oral daily  pantoprazole    Tablet 40 milliGRAM(s) Oral before breakfast  polyethylene glycol 3350 17 Gram(s) Oral daily  senna 2 Tablet(s) Oral at bedtime  simvastatin 20 milliGRAM(s) Oral at bedtime    MEDICATIONS  (PRN):  benzocaine 15 mG/menthol 3.6 mG Lozenge 1 Lozenge Oral five times a day PRN Sore Throat  dextrose 40% Gel 15 Gram(s) Oral once PRN Blood Glucose LESS THAN 70 milliGRAM(s)/deciliter  diazepam    Tablet 2 milliGRAM(s) Oral every 8 hours PRN Spasm  glucagon  Injectable 1 milliGRAM(s) IntraMuscular once PRN Glucose LESS THAN 70 milligrams/deciliter  magnesium hydroxide Suspension 30 milliLiter(s) Oral every 12 hours PRN Constipation  ondansetron Injectable 4 milliGRAM(s) IV Push every 6 hours PRN Nausea  traMADol 50 milliGRAM(s) Oral every 8 hours PRN mild to moderate pain  traMADol 100 milliGRAM(s) Oral every 8 hours PRN Severe Pain (7 - 10)      VITALS:  T(F): 98.9 (05-17-19 @ 13:54), Max: 98.9 (05-17-19 @ 13:54)  HR: 99 (05-17-19 @ 13:54) (79 - 99)  BP: 157/85 (05-17-19 @ 13:54) (157/85 - 175/85)  RR: 16 (05-17-19 @ 13:54) (16 - 17)  SpO2: 96% (05-17-19 @ 13:54)      PHYSICAL EXAM:  GENERAL: Morbidly obese, in NAD  RESPIRATORY: Clear to auscultation bilaterally; No rales, rhonchi, wheezing, or rubs  CARDIOVASCULAR: Regular rate and rhythm; No murmurs, rubs, or gallops  GASTROINTESTINAL: Protuberant, Soft, Nontender, Nondistended; Bowel sounds present  EXTREMITIES:  2+ Peripheral Pulses, No clubbing, cyanosis, or edema  NERVOUS SYSTEM:  Alert & Oriented X3; Moving all 4 extremities; No gross sensory deficits        LABS:              11.6                 136  | 23   | 10           8.02  >-----------< 250     ------------------------< 162                   35.5                 3.8  | 99   | 0.82                                         Ca 9.4   Mg x     Ph x                [ ] Consultant(s) Notes Reviewed:  [x] Care Discussed with Consultants/Other Providers: Orthopedic PA - discussed

## 2019-05-17 NOTE — PROGRESS NOTE ADULT - ASSESSMENT
73M POD1 s/p L2-5 lami/L4/5 in situ fusion, doing well  1. Pain control  2. Venodynes  3. WBAT/PT/OT/OOB  4. Brace when OOB  5. Cont HV  6. FU Med  7. Dispo planning  8. D/w Dr. Gambino, Dr. Paul Juan MD
73M POD2 s/p L2-5 lami/L4/5 in situ fusion, doing well  1. Pain control - added IV tylenol and changed valium to 2 mg q 8 hrs  2. Venodynes  3. WBAT/PT/OT/OOB  4. Brace when OOB  5. Cont HV  6. FU Med  7. Dispo planning  8. D/w Dr. Gambino, Dr. Paul Juan MD
73M POD3 s/p L2-5 lami/L4/5 in situ fusion, doing well  1. Pain control  2. Venodynes  3. WBAT/PT/OT/OOB  4. Brace when OOB  5. Dispo planning - home today  6. D/w Dr. Gambino, Dr. Paul Juan MD
Mr. Franklin is a 72 yo man with hx of morbid obesity, HTN, HLD, and DM Type II admitted for elective  L2-5 lami/L4/5 in situ fusion for L2-5 spinal stenosis and L4/5 spondylolisthesis done on 5/14/19.
Mr. Franklin is a 72 yo man with hx of morbid obesity, HTN, HLD, and DM Type II admitted for elective  L2-5 lami/L4/5 in situ fusion for L2-5 spinal stenosis and L4/5 spondylolisthesis done on 5/14/19.

## 2019-05-17 NOTE — PROGRESS NOTE ADULT - PROBLEM SELECTOR PLAN 2
Pain  controlled  - continue PRN tramadol   - c/w diazepam PO 2mg q8h PRN  - daily bowel regimen to ensure adequate BMs, continue senna, colace, PRN enema
Pain better controlled in the afternoon.   - DISCONTINUED oxycodone 10mg q4h PRN for moderate pain and oxycodone 15mg q4h PRN for severe pain due to oversedation --> transitioned to PRN tramadol instead;   - c/w diazepam PO 5mg q8h; hydromorphone 0.5mg IV q3h for PRN breakthrough pain  - daily bowel regimen to ensure adequate BMs, continue senna, colace, PRN enema. Will escalate as needed

## 2019-05-17 NOTE — DISCHARGE NOTE PROVIDER - HOSPITAL COURSE
The patient is a 74 y/o M PMH significant HTN, HLD, DM2, lumbar stenosis with listhesis status post L2-L5 laminectomy, L4-L5 in situ fusion.  The patient tolerated the procedure well. Hospital course was uneventful.  Incisional pain is controlled with current regimen.  Acute on chronic radicular symptoms have resolved as a result of surgical decompression. Patient has met functional goals with physical therapy.        The patient is neurologically intact and medically cleared for discharge to home at this time.          Restrictions for at least 8 weeks; no strenuous activity.  No lift push/pull/carry objects greater than 10 lbs.  No repetitive bending/lifting/twisting/squatting.  No prolonged sitting/standing/walking.         Keep incision site clean/dry/intact at all times. Dressing changes as needed.  Materials provided.          Follow-up Dr. Gambino in 2 weeks for routine post-operative follow-up.

## 2019-05-17 NOTE — PROGRESS NOTE ADULT - PROBLEM SELECTOR PLAN 1
- management as per ortho  - SCDs for DVT ppx  - encourage incentive spirometry use  - daily PT.
- management as per ortho  - SCDs for DVT ppx  - encourage incentive spirometry use  - daily PT.

## 2019-05-17 NOTE — PROGRESS NOTE ADULT - REASON FOR ADMISSION
s/p L2-L5 Laminectomy, L4-L5 In situ fusion
Lumbar spine surgery
Elective L2-5 lami/L4/5 in situ fusion,
Elective L2-5 lami/L4/5 in situ fusion,

## 2019-05-17 NOTE — PROGRESS NOTE ADULT - PROBLEM SELECTOR PLAN 4
- patient on metformin and glimepiride as an outpatient, will hold while inpatient for now  - FSG checks with low correction sliding scale insulin protocol.
- patient on metformin and glimepiride as an outpatient, will hold while inpatient for now  - FSG checks with low correction sliding scale insulin protocol.

## 2019-05-17 NOTE — DISCHARGE NOTE PROVIDER - CARE PROVIDER_API CALL
Oswald Gambino (MD; DC)  Orthopaedic Surgery  [address], [address]  [place] [address]  Phone: [phone]  Fax: [phone]  Follow Up Time:

## 2019-05-17 NOTE — PROGRESS NOTE ADULT - PROBLEM SELECTOR PLAN 3
BP not optimally controlled in the setting of missing dose of home antihypertensive meds  - continue Imdur 30mg daily  - continue enalapril 5mg daily.
BP not optimally controlled in the setting of missing dose of home antihypertensive meds  - continue Imdur 30mg daily  - continue enalapril 5mg daily.

## 2019-05-17 NOTE — DISCHARGE NOTE PROVIDER - NSDCACTIVITY_GEN_ALL_CORE
Do not make important decisions/Do not drive or operate machinery/Walking - Indoors allowed/Showering allowed/Stairs allowed/No heavy lifting/straining/Walking - Outdoors allowed

## 2019-05-17 NOTE — PROGRESS NOTE ADULT - SUBJECTIVE AND OBJECTIVE BOX
Patient seen and examined; pain controlled, back pain improved; RLE symptoms resolved from preop; no acute overnight events    Vital Signs Last 24 Hrs  T(C): 36.4 (17 May 2019 05:35), Max: 37.1 (16 May 2019 07:05)  T(F): 97.6 (17 May 2019 05:35), Max: 98.8 (16 May 2019 14:22)  HR: 97 (17 May 2019 05:35) (79 - 107)  BP: 160/93 (17 May 2019 05:35) (146/83 - 188/114)  BP(mean): --  RR: 16 (17 May 2019 05:35) (14 - 16)  SpO2: 97% (17 May 2019 05:35) (94% - 97%)                          11.3   9.26  )-----------( 269      ( 16 May 2019 05:26 )             34.7     05-16    134<L>  |  99  |  11  ----------------------------<  284<H>  3.8   |  24  |  0.91    Ca    9.4      16 May 2019 05:26    MEDICATIONS  (STANDING):  acetaminophen   Tablet .. 650 milliGRAM(s) Oral every 6 hours  dextrose 5%. 1000 milliLiter(s) (50 mL/Hr) IV Continuous <Continuous>  dextrose 50% Injectable 12.5 Gram(s) IV Push once  dextrose 50% Injectable 25 Gram(s) IV Push once  dextrose 50% Injectable 25 Gram(s) IV Push once  docusate sodium 100 milliGRAM(s) Oral three times a day  enalapril 5 milliGRAM(s) Oral daily  fenofibrate Tablet 145 milliGRAM(s) Oral daily  insulin lispro (HumaLOG) corrective regimen sliding scale   SubCutaneous three times a day before meals  insulin lispro (HumaLOG) corrective regimen sliding scale   SubCutaneous at bedtime  isosorbide   mononitrate ER Tablet (IMDUR) 30 milliGRAM(s) Oral daily  lactated ringers. 1000 milliLiter(s) (30 mL/Hr) IV Continuous <Continuous>  lactated ringers. 1000 milliLiter(s) (100 mL/Hr) IV Continuous <Continuous>  multivitamin 1 Tablet(s) Oral daily  pantoprazole    Tablet 40 milliGRAM(s) Oral before breakfast  polyethylene glycol 3350 17 Gram(s) Oral daily  senna 2 Tablet(s) Oral at bedtime  simvastatin 20 milliGRAM(s) Oral at bedtime    MEDICATIONS  (PRN):  benzocaine 15 mG/menthol 3.6 mG Lozenge 1 Lozenge Oral five times a day PRN Sore Throat  dextrose 40% Gel 15 Gram(s) Oral once PRN Blood Glucose LESS THAN 70 milliGRAM(s)/deciliter  diazepam    Tablet 2 milliGRAM(s) Oral every 8 hours PRN Spasm  glucagon  Injectable 1 milliGRAM(s) IntraMuscular once PRN Glucose LESS THAN 70 milligrams/deciliter  HYDROmorphone  Injectable 0.5 milliGRAM(s) IV Push every 4 hours PRN Breakthrough pain  magnesium hydroxide Suspension 30 milliLiter(s) Oral every 12 hours PRN Constipation  ondansetron Injectable 4 milliGRAM(s) IV Push every 6 hours PRN Nausea  traMADol 50 milliGRAM(s) Oral every 8 hours PRN mild to moderate pain  traMADol 100 milliGRAM(s) Oral every 8 hours PRN Severe Pain (7 - 10)    NAD  Lumbar dressing changed  Incision c/d/i   HV removed, tip intact, no bleeding  5/5 BL IP/Q/H/TA/GS/EHL/FHL  SIRI PHAM

## 2019-05-20 ENCOUNTER — INBOUND DOCUMENT (OUTPATIENT)
Age: 74
End: 2019-05-20

## 2019-05-29 ENCOUNTER — APPOINTMENT (OUTPATIENT)
Dept: ORTHOPEDIC SURGERY | Facility: CLINIC | Age: 74
End: 2019-05-29
Payer: MEDICARE

## 2019-05-29 DIAGNOSIS — M43.16 SPONDYLOLISTHESIS, LUMBAR REGION: ICD-10-CM

## 2019-05-29 DIAGNOSIS — Z98.890 OTHER SPECIFIED POSTPROCEDURAL STATES: ICD-10-CM

## 2019-05-29 PROCEDURE — 72100 X-RAY EXAM L-S SPINE 2/3 VWS: CPT

## 2019-05-29 PROCEDURE — 99024 POSTOP FOLLOW-UP VISIT: CPT

## 2019-05-29 NOTE — DISCUSSION/SUMMARY
[de-identified] : 2 week postop lumbar laminectomy and fusion. \par He is doing great. We discussed that the do's and don'ts.\par PT. \par F/U 1 month. \par All options discussed including rest, medicine, home exercise, acupuncture, Chiropractic care, Physical Therapy, Pain management, and last resort surgery.\par All questions were answered, all alternatives discussed and the patient is in complete agreement with that plan. Follow-up appointment as instructed. Any issues and the patient will call or come in sooner. \par His wife agrees with the plan.

## 2019-05-29 NOTE — PHYSICAL EXAM
[de-identified] : 5 out of 5 motor strength, sensation is intact and symmetrical full range of motion flexion extension and rotation, no palpatory tenderness full range of motion of hips knees shoulders and elbows (all four extremities), no atrophy, negative straight leg raise, no pathological reflexes, no swelling, normal ambulation, no apparent distress skin is intact, no palpable lymph nodes, no upper or lower extremity instability, alert and oriented x3 and normal mood. Normal finger-to nose test. \par Sutures removed. \par Incision is healed with no signs of infection.  [de-identified] : AP/lat lumbar 05/29/2019:

## 2019-05-29 NOTE — HISTORY OF PRESENT ILLNESS
[5] : an average pain level of 5/10 [Improving] : improving [de-identified] : 73y/o male presents for 2 week postop L2, L3, L4, L5, S1 laminectomy; fusion of L4L5 using local autograft bone demineralized bone matrix  with spinal cord monitoring. \par Patient reports feeling well.\par Complete resolution of sciatic following surgery.\par He has been doing home PT. \par States that he fatigues more easily since the surgery. \par He is currently ambulating with walker. \par Taking Extra Strength Tylenol as needed. \par Here with his wife.\par Icing as well. \par C/o left-sided SI joint/buttock pain. \par Has had 2 episodes of buckling in the right knee - no hx of buckling prior to surgery.\par Hx of OA in bilateral knees, R>L.\par Treated with cortisone injections - last injection done in February 2019 into right knee. \par \par He reports the pain that used to radiate down the right leg is gone. He denies any numbness or tingling.  Patient is taking extra-strength tylenol for the back surgical pain, with relief. \par

## 2019-05-29 NOTE — ADDENDUM
[FreeTextEntry1] : This note was authored by Natalie Oscar working as a medical scribe for Dr. Oswald Gambino. The note was reviewed, edited, and revised by Dr. Oswald Gambino whom is in agreement with the exam findings, imaging findings, and treatment plan. 05/29/2019.

## 2019-06-26 ENCOUNTER — APPOINTMENT (OUTPATIENT)
Dept: ORTHOPEDIC SURGERY | Facility: CLINIC | Age: 74
End: 2019-06-26
Payer: MEDICARE

## 2019-06-26 PROCEDURE — 72100 X-RAY EXAM L-S SPINE 2/3 VWS: CPT

## 2019-06-26 PROCEDURE — 99024 POSTOP FOLLOW-UP VISIT: CPT

## 2019-06-26 NOTE — DISCUSSION/SUMMARY
[de-identified] : 6 week postop lumbar laminectomy and fusion. \par He is doing great. We discussed that the do's and don'ts.\par Continue with PT. \par F/U 2 month. \par All options discussed including rest, medicine, home exercise, acupuncture, Chiropractic care, Physical Therapy, Pain management, and last resort surgery.\par All questions were answered, all alternatives discussed and the patient is in complete agreement with that plan. Follow-up appointment as instructed. Any issues and the patient will call or come in sooner. \par His wife agrees with the plan. \par

## 2019-06-26 NOTE — HISTORY OF PRESENT ILLNESS
[Stable] : stable [de-identified] : 75y/o male presents for 6 week postop L2, L3, L4, L5, S1 laminectomy; fusion of L4_L5 using local autograft bone demineralized bone matrix with spinal cord monitoring. \par Patient reports feeling well.\par Has had 4 episodes of fall since the surgery, 3 in house and 1 outside with the cane - attributing to the right leg weakness and buckling on the knee. \par Most recent fall yesterday on wet concrete - cane slipped, landed on the knees and right elbow then rolled onto the back\par Soreness but no back pain. \par Complete resolution of sciatic following surgery - no longer experiencing leg pain from sciatica. \par Finished home PT, now doing out patient PT x 2 week - working on strengthening the right leg. \par States that he fatigues but not as much since last visit. \par He is currently ambulating with cane. \par Taking Extra Strength Tylenol as needed. \par Here with his wife.\par Icing as well. \par No longer experiencing incisional pain.  \par Has had 2 episodes of buckling in the right knee - no hx of buckling prior to surgery.\par Hx of OA in bilateral knees, R>L.\par Treated with cortisone injections - last injection done in February 2019 into right knee. \par He reports the pain that used to radiate down the right leg is gone. He denies any numbness or tingling. Patient is taking extra-strength Tylenol for the back surgical pain, with relief. \par He states the symptoms are improving. Pain levels include an average pain level of 5/10.

## 2019-06-26 NOTE — PHYSICAL EXAM
[Normal] : Gait: normal [de-identified] : 5 out of 5 motor strength, sensation is intact and symmetrical full range of motion flexion extension and rotation, no palpatory tenderness full range of motion of hips knees shoulders and elbows (all four extremities), no atrophy, negative straight leg raise, no pathological reflexes, no swelling, normal ambulation, no apparent distress skin is intact, no palpable lymph nodes, no upper or lower extremity instability, alert and oriented x3 and normal mood. Normal finger-to nose test. \par Incision is healed with no signs of infection. \par  [de-identified] : AP/lat lumbar 05/29/2019:

## 2019-06-26 NOTE — ADDENDUM
[FreeTextEntry1] : This note was authored by Natalie Oscar working as a medical scribe for Dr. Oswald Gambino. The note was reviewed, edited, and revised by Dr. Oswald Gambino whom is in agreement with the exam findings, imaging findings, and treatment plan. 06/26/2019.

## 2019-08-28 ENCOUNTER — APPOINTMENT (OUTPATIENT)
Dept: ORTHOPEDIC SURGERY | Facility: CLINIC | Age: 74
End: 2019-08-28
Payer: MEDICARE

## 2019-08-28 PROCEDURE — 99214 OFFICE O/P EST MOD 30 MIN: CPT

## 2019-08-28 PROCEDURE — 72100 X-RAY EXAM L-S SPINE 2/3 VWS: CPT

## 2019-08-28 NOTE — PHYSICAL EXAM
[Normal] : Gait: normal [de-identified] : 5 out of 5 motor strength, sensation is intact and symmetrical full range of motion flexion extension and rotation, no palpatory tenderness full range of motion of hips knees shoulders and elbows (all four extremities), no atrophy, negative straight leg raise, no pathological reflexes, no swelling, normal ambulation, no apparent distress skin is intact, no palpable lymph nodes, no upper or lower extremity instability, alert and oriented x3 and normal mood. Normal finger-to nose test. \par Incision is healed with no signs of infection. \par  [de-identified] : AP/lat lumbar-prior laminectomy and mild hip arhtritis-reviewed with the patient.

## 2019-08-28 NOTE — HISTORY OF PRESENT ILLNESS
[Stable] : stable [de-identified] : 73y/o male presents for 4.5 months postop L2, L3, L4, L5, S1 laminectomy; fusion of L4_L5 using local autograft bone demineralized bone matrix with spinal cord monitoring. \par Patient reports no longer having back pain.Sciatica resolved.\par Patient going to physical therapy.\par Uses cane to assist with ambulation especially when outside of home.\par No further episodes of falling.\par Patient now c/o right upper and lateral right thigh pain.\par Offered injection for bursitis;prefers to take anti inflammatory.\par Has had 4 episodes of fall since the surgery, 3 in house and 1 outside with the cane - attributing to the right leg weakness and buckling on the knee. \par Most recent fall yesterday on wet concrete - cane slipped, landed on the knees and right elbow then rolled onto the back\par Soreness but no back pain. \par Complete resolution of sciatic following surgery - no longer experiencing leg pain from sciatica. \par Finished home PT, now doing out patient PT x 2 week - working on strengthening the right leg. \par States that he fatigues but not as much since last visit. \par He is currently ambulating with cane. \par Taking Extra Strength Tylenol as needed. \par Here with his wife.\par Icing as well. \par No longer experiencing incisional pain.  \par Has had 2 episodes of buckling in the right knee - no hx of buckling prior to surgery.\par Hx of OA in bilateral knees, R>L.\par Treated with cortisone injections - last injection done in February 2019 into right knee. \par He reports the pain that used to radiate down the right leg is gone. He denies any numbness or tingling. Patient is taking extra-strength Tylenol for the back surgical pain, with relief. \par He states the symptoms are improving. Pain levels include an average pain level of 5/10.

## 2019-08-28 NOTE — ADDENDUM
[FreeTextEntry1] :  This note was authored by Natalie Oscar working as a medical scribe for Dr. Oswald Gambino. The note was reviewed, edited, and revised by Dr. Oswald Gambino whom is in agreement with the exam findings, imaging findings, and treatment plan. 08/14/2019.

## 2019-08-28 NOTE — DISCUSSION/SUMMARY
[de-identified] : 4.5 months postop lumbar laminectomy and fusion. \par He is doing great. We discussed that the do's and don'ts.\par Right hip trochanteric bursitis.\par Voltaren.\par refused injection today\par Continue with PT. \par F/U 3 months.  \par going to [place]\par All options discussed including rest, medicine, home exercise, acupuncture, Chiropractic care, Physical Therapy, Pain management, and last resort surgery.\par All questions were answered, all alternatives discussed and the patient is in complete agreement with that plan. Follow-up appointment as instructed. Any issues and the patient will call or come in sooner. \par \par

## 2019-10-29 ENCOUNTER — RX RENEWAL (OUTPATIENT)
Age: 74
End: 2019-10-29

## 2020-01-22 ENCOUNTER — APPOINTMENT (OUTPATIENT)
Dept: ORTHOPEDIC SURGERY | Facility: CLINIC | Age: 75
End: 2020-01-22
Payer: MEDICARE

## 2020-01-22 PROCEDURE — 99213 OFFICE O/P EST LOW 20 MIN: CPT

## 2020-01-22 PROCEDURE — 72100 X-RAY EXAM L-S SPINE 2/3 VWS: CPT

## 2020-01-22 NOTE — ADDENDUM
[FreeTextEntry1] : This note was authored by Natalie Oscar working as a medical scribe for Dr. Oswald Gambino. The note was reviewed, edited, and revised by Dr. Oswald Gambino whom is in agreement with the exam findings, imaging findings, and treatment plan. Jan 22, 2020

## 2020-01-22 NOTE — HISTORY OF PRESENT ILLNESS
[Stable] : stable [de-identified] : 8 months postop L2, L3, L4, L5, S1 laminectomy; fusion of L4_L5 using local autograft bone demineralized bone matrix with spinal cord monitoring. \par He fell down 4 stairs 1 month ago when his right leg buckled.  \par He went to [facility] ED.\par He states that he has LBP with lateral movement.\par Not currently attending PT.  Exercises on his own at home.\par Ambulates without a cane.\par Has had 5 episodes of fall since the surgery, 3 in house and 1 outside with the cane, 1 down the stairs - attributing to the right leg weakness and buckling on the knee. \par Complete resolution of sciatic following surgery - no longer experiencing leg pain from sciatica. \par Taking Extra Strength Tylenol as needed. \par Here with his wife.\par Icing as well. \par No longer experiencing incisional pain.  \par Has had 2 episodes of buckling in the right knee - no hx of buckling prior to surgery.\par Hx of OA in bilateral knees, R>L.\par Treated with cortisone injections - last injection done in February 2019 into right knee. \par He reports the pain that used to radiate down the right leg is gone. He denies any numbness or tingling. Patient is taking extra-strength Tylenol for the back surgical pain, with relief.

## 2020-01-22 NOTE — PHYSICAL EXAM
[UE/LE] : Sensory: Intact in bilateral upper & lower extremities [ALL] : dorsalis pedis, posterior tibial, femoral, popliteal, and radial 2+ and symmetric bilaterally [Normal] : Oriented to person, place, and time, insight and judgement were intact and the affect was normal [Poor Appearance] : well-appearing [Acute Distress] : not in acute distress [de-identified] : 5 out of 5 motor strength, sensation is intact and symmetrical full range of motion flexion extension and rotation, no palpatory tenderness full range of motion of hips knees shoulders and elbows (all four extremities), no atrophy, negative straight leg raise, no pathological reflexes, no swelling, normal ambulation, no apparent distress skin is intact, no palpable lymph nodes, no upper or lower extremity instability, alert and oriented x3 and normal mood. Normal finger-to nose test. \par Incision is healed with no signs of infection. \par  [de-identified] : AP/lat lumbar 01/22/2020 -prior laminectomy and mild hip arthritis. No compression fracture(s) -reviewed with the patient and his wife.

## 2020-01-22 NOTE — DISCUSSION/SUMMARY
[de-identified] : 8 months postop lumbar laminectomy and fusion. \par Doing well. \par X-rays do not reveal compression fracture. \par We discussed all options. \par PT referral provided. \par NSAIDs as tolerated. \par F/U 3 months. \par All options discussed including rest, medicine, home exercise, acupuncture, Chiropractic care, Physical Therapy, Pain management, and last resort surgery.\par All questions were answered, all alternatives discussed and the patient is in complete agreement with that plan. Follow-up appointment as instructed. Any issues and the patient will call or come in sooner. \par Patient's wife agrees with the plan. \par

## 2020-04-22 ENCOUNTER — APPOINTMENT (OUTPATIENT)
Dept: ORTHOPEDIC SURGERY | Facility: CLINIC | Age: 75
End: 2020-04-22

## 2020-05-13 ENCOUNTER — APPOINTMENT (OUTPATIENT)
Dept: ORTHOPEDIC SURGERY | Facility: CLINIC | Age: 75
End: 2020-05-13
Payer: MEDICARE

## 2020-05-13 VITALS
WEIGHT: 170 LBS | DIASTOLIC BLOOD PRESSURE: 91 MMHG | HEART RATE: 86 BPM | SYSTOLIC BLOOD PRESSURE: 183 MMHG | BODY MASS INDEX: 28.32 KG/M2 | HEIGHT: 65 IN

## 2020-05-13 PROCEDURE — 99214 OFFICE O/P EST MOD 30 MIN: CPT

## 2020-05-13 NOTE — HISTORY OF PRESENT ILLNESS
[Stable] : stable [de-identified] : 8 months postop L2, L3, L4, L5, S1 laminectomy; fusion of L4_L5 using local autograft bone demineralized bone matrix with spinal cord monitoring. \par Patient denies any recent falls. the alst fall was in December. \par He states that he has LBP with lateral movement and forward flexion. \par Not currently attending PT.  Exercises on his own at home. wishes to return to PT when it is more safe. \par Ambulates without a cane.\par Has had 5 episodes of fall since the surgery, 3 in house and 1 outside with the cane, 1 down the stairs - attributing to the right leg weakness and buckling on the knee. \par Complete resolution of sciatic following surgery - no longer experiencing leg pain from sciatica. \par Taking Extra Strength Tylenol as needed. \par Here with his wife.\par Icing as well. \par No longer experiencing incisional pain.  \par Has had 2 episodes of buckling in the right knee - no hx of buckling prior to surgery.\par Hx of OA in bilateral knees, R>L.\par Treated with cortisone injections - last injection done in February 2019 into right knee. \par He reports the pain that used to radiate down the right leg is gone. He denies any numbness or tingling. Patient is taking extra-strength Tylenol for the back surgical pain, with relief.

## 2020-05-13 NOTE — DISCUSSION/SUMMARY
[de-identified] : 12 months postop lumbar laminectomy and fusion. \par Doing well. \par We discussed all options. \par PT referral provided. \par NSAIDs as tolerated. \par All options discussed including rest, medicine, home exercise, acupuncture, Chiropractic care, Physical Therapy, Pain management, and last resort surgery.\par All questions were answered, all alternatives discussed and the patient is in complete agreement with that plan. Follow-up appointment as instructed. Any issues and the patient will call or come in sooner. \par Patient's wife agrees with the plan. \par

## 2020-05-13 NOTE — PHYSICAL EXAM
[UE/LE] : Sensory: Intact in bilateral upper & lower extremities [ALL] : dorsalis pedis, posterior tibial, femoral, popliteal, and radial 2+ and symmetric bilaterally [Normal] : Oriented to person, place, and time, insight and judgement were intact and the affect was normal [Poor Appearance] : well-appearing [Acute Distress] : not in acute distress [de-identified] : 5 out of 5 motor strength, sensation is intact and symmetrical full range of motion flexion extension and rotation, no palpatory tenderness full range of motion of hips knees shoulders and elbows (all four extremities), no atrophy, negative straight leg raise, no pathological reflexes, no swelling, normal ambulation, no apparent distress skin is intact, no palpable lymph nodes, no upper or lower extremity instability, alert and oriented x3 and normal mood. Normal finger-to nose test. \par Incision is healed with no signs of infection. \par

## 2020-11-04 ENCOUNTER — RX RENEWAL (OUTPATIENT)
Age: 75
End: 2020-11-04

## 2020-11-18 NOTE — CONSULT NOTE ADULT - PROBLEM SELECTOR RECOMMENDATION 4
No - patient on metformin and glimepiride as an outpatient, will hold while inpatient for now  - FSG checks with low correction sliding scale insulin protocol

## 2020-11-25 ENCOUNTER — APPOINTMENT (OUTPATIENT)
Dept: ORTHOPEDIC SURGERY | Facility: CLINIC | Age: 75
End: 2020-11-25

## 2020-11-28 NOTE — ASU PATIENT PROFILE, ADULT - BARIATRIC
NEPHROLOGY CONSULT    Referring MD: No ref. provider found    Chief Complaint: CKD5.    MD and Nurses notes reviewed and accepted.    HPI: Barrie Tripathi is a 43 year old male referred for management of CKD and associated complications. Pt was admitted with weakness, mild SOB and has COVID.    REVIEW OF SYSTEMS    Constitutional: Negative for fever, chills, diaphoresis, has activity change, appetite change, fatigue.  HENT: Negative for nosebleeds, sore throat, facial swelling, mouth sores, trouble swallowing, neck pain, neck stiffness and postnasal drip.  Eyes: Negative for pain, discharge and redness.  Respiratory: Negative for cough, chest tightness, shortness of breath and wheezing.  Cardiovascular: Negative for chest pain and palpitations.  Gastrointestinal: Negative for nausea, vomiting, abdominal pain, diarrhea, constipation and abdominal distention.  Genitourinary: Negative for dysuria, urgency, frequency, hematuria, flank pain, decreased urine volume and difficulty urinating.  Musculoskeletal: Negative for myalgias, back pain, joint swelling, arthralgias and gait problem.  Skin: Negative for rash.  Neurological: Negative for dizziness, tremors, speech difficulty, weakness, numbness and headaches.  Hematological: Does not bruise/bleed easily.  Psychiatric/Behavioral: Negative for confusion, sleep disturbance and agitation. The patient is not nervous/anxious.    Past Medical History:   Diagnosis Date   • Chronic kidney disease, stage III (moderate) creat 2.35, 2018 (CMS/[facility]) 6/11/2018   • Diabetes (CMS/[facility])    • Hypertension    • Morbid obesity, BMI 60.6, 69\", 186 kg, 2018 (CMS/[facility]) 3/18/2016   • Obesity    • Secondary hyperparathyroidism, renal (CMS/[facility]) 6/11/2018   • Sleep apnea,  , min sat 75%, CPAP at 11 to 15, 2016 12/9/2016   • Type 2 diabetes mellitus with diabetic nephropathy, with long-term current use of insulin (CMS/[facility]) 6/9/2015       Social History     Socioeconomic History   • Marital  status: Single     Spouse name: Not on file   • Number of children: Not on file   • Years of education: Not on file   • Highest education level: Not on file   Occupational History   • Occupation: .     Comment: Off work since    Social Needs   • Financial resource strain: Not on file   • Food insecurity     Worry: Not on file     Inability: Not on file   • Transportation needs     Medical: Not on file     Non-medical: Not on file   Tobacco Use   • Smoking status: Former Smoker     Packs/day: 0.25     Types: Cigarettes     Quit date:      Years since quittin.9   • Smokeless tobacco: Never Used   • Tobacco comment: Cigars 3-4 x a month 20    Substance and Sexual Activity   • Alcohol use: Not Currently     Alcohol/week: 0.0 standard drinks   • Drug use: No   • Sexual activity: Not on file   Lifestyle   • Physical activity     Days per week: Not on file     Minutes per session: Not on file   • Stress: Not on file   Relationships   • Social connections     Talks on phone: Not on file     Gets together: Not on file     Attends Je[employer]'s witness service: Not on file     Active member of club or organization: Not on file     Attends meetings of clubs or organizations: Not on file     Relationship status: Not on file   • Intimate partner violence     Fear of current or ex partner: Not on file     Emotionally abused: Not on file     Physically abused: Not on file     Forced sexual activity: Not on file   Other Topics Concern   •  Service Not Asked   • Blood Transfusions Not Asked   • Caffeine Concern Not Asked   • Occupational Exposure No   • Hobby Hazards No   • Sleep Concern Yes   • Stress Concern Not Asked   • Weight Concern Yes   • Special Diet Not Asked   • Back Care Not Asked   • Exercise Not Asked   • Bike Helmet Not Asked   • Seat Belt Not Asked   • Self-Exams Not Asked   Social History Narrative    Single, living with a woman, no pets, 2018,       Past Surgical History:   Procedure  Laterality Date   • Bariatric surgery     • Hip surgery Left 1992, 1993    left hip pinning, removal of pin a year later       Family History   Problem Relation Age of Onset   • Diabetes Mother         borderline   • Cancer Father         ? site, in remission   • Hypertension Sister    • Kidney disease Sister    • Diabetes Sister         underwent kidney transplant FH neg for YAZAN       ALLERGIES:   Allergen Reactions   • Hydralazine Other (See Comments)     Shortness of breath   • Latex RASH   • Penicillins GI UPSET and CONSTIPATION       Current Facility-Administered Medications   Medication Dose Route Frequency Provider Last Rate Last Admin   • sodium bicarbonate tablet 650 mg  650 mg Oral TID Shari Bowers MD       • atorvastatin (LIPITOR) tablet 40 mg  40 mg Oral Daily Godwin Nieto MD   40 mg at 11/28/20 0945   • sodium chloride 0.9 % flush bag 25 mL  25 mL Intravenous PRN Godwin Nieto MD       • sodium chloride (PF) 0.9 % injection 2 mL  2 mL Intracatheter 2 times per day Godiwn Nieto MD   2 mL at 11/28/20 0938   • sodium chloride (NORMAL SALINE) 0.9 % bolus 500 mL  500 mL Intravenous PRN Godwin Nieto MD       • heparin (porcine) injection 7,500 Units  7,500 Units Subcutaneous 3 times per day Godwni Nieto MD   7,500 Units at 11/28/20 0721   • dextrose 50 % injection 25 g  25 g Intravenous PRN Godwin Nieto MD       • dextrose 50 % injection 12.5 g  12.5 g Intravenous PRN Godwin Nieto MD       • dextrose 5 % infusion   Intravenous Continuous PRN Godwin Nieto MD       • glucagon (GLUCAGEN) injection 1 mg  1 mg Intramuscular PRN Godwin Nieto MD       • dextrose (GLUTOSE) 40 % gel 15 g  15 g Oral PRN Godwin Nieto MD       • dextrose (GLUTOSE) 40 % gel 30 g  30 g Oral PRN Godwin Nieto MD       • insulin lispro (HumaLOG) sliding scale injection   Subcutaneous TID AC Godwin Nieto MD       • cloNIDine (CATAPRES) tablet 0.1 mg  0.1 mg Oral TID  no Godwin Nieto MD   0.1 mg at 11/28/20 0937   • carvedilol (COREG) tablet 12.5 mg  12.5 mg Oral QAM Godwin Nieto MD   12.5 mg at 11/28/20 0721   • [Held by provider] lisinopril (ZESTRIL) tablet 10 mg  10 mg Oral Daily Godwin Nieto MD       • doxazosin (CARDURA) tablet 4 mg  4 mg Oral Nightly Godwin Nieto MD   4 mg at 11/27/20 2342   • calcitRIOL (ROCALTROL) capsule 0.5 mcg  0.5 mcg Oral Daily Godwin Nieto MD   0.5 mcg at 11/28/20 0937   • zinc sulfate (ZINCATE) capsule 220 mg  220 mg Oral Daily with breakfast Godwin Nieto MD   220 mg at 11/28/20 0937   • amLODIPine (NORVASC) tablet 10 mg  10 mg Oral Daily Shari Bowers MD   10 mg at 11/27/20 2342   • carvedilol (COREG) tablet 25 mg  25 mg Oral Nightly Shari Bowers MD   25 mg at 11/27/20 2342       PHYSICAL EXAM    Constitutional: He  is awake and alert. He appears well-developed and well-nourished.  He is active and cooperative. He does not have a sickly appearance. No distress.  Visit Vitals  BP (!) 144/79 (BP Location: RUE - Right upper extremity, Patient Position: Semi-Mendez's)   Pulse 80   Temp 97.9 °F (36.6 °C) (Oral)   Resp 18   Ht 5' 9\" (1.753 m)   Wt 124.5 kg   SpO2 96%   BMI 40.53 kg/m²     Head: Normocephalic and atraumatic.  Nose: Nose normal.  Mouth/Throat: Oropharynx is clear and moist.  Eyes: Conjunctivae and EOM are normal. Right eye exhibits no discharge, Left eye exhibits no discharge. No scleral icterus.  Neck: Normal range of motion. Neck supple. No JVD present. No tracheal tenderness present. No tracheal deviation present. No thyromegaly present.  Cardiovascular: Normal rate, regular rhythm, normal heart sounds, intact distal pulses and normal pulses. Exam reveals no friction rub.  Pulmonary/Chest: Effort normal and breath sounds normal. No accessory muscle usage. Not tachypneic. No respiratory distress. He  has no wheezes.He  has no rales. He exhibits no tenderness.  Abdominal: Soft. Bowel sounds are  normal. He exhibits no distension and no ascites. There is no tenderness. There is no guarding and no CVA tenderness.  Musculoskeletal: Normal range of motion. He exhibits no edema and no tenderness.  Neurological: He is awake and alert. He displays no atrophy and no tremor. He exhibits normal muscle tone. He displays no seizure activity.  Skin: Skin is warm. No rash noted. He is not diaphoretic. No cyanosis or erythema. No pallor. Nails show no clubbing.    PROTEIN(URINE)   Date Value Ref Range Status   05/05/2019 100 (A) NEGATIVE mg/dL Final   05/04/2019 100 (A) NEGATIVE mg/dL Final   04/22/2019 100 (A) NEGATIVE mg/dL Final     NITRITE   Date Value Ref Range Status   05/05/2019 NEGATIVE NEGATIVE Final   05/04/2019 NEGATIVE NEGATIVE Final   04/22/2019 NEGATIVE NEGATIVE Final     LEUKOCYTE ESTERASE   Date Value Ref Range Status   05/05/2019 NEGATIVE NEGATIVE Final   05/04/2019 NEGATIVE NEGATIVE Final   04/22/2019 NEGATIVE NEGATIVE Final     VITAMIND, 25 HYDROXY   Date Value Ref Range Status   12/07/2019 43.1 30.0 - 100.0 ng/mL Final     Comment:     <20  ng/mL=Vitamin D deficiency  20-29  ng/mL=Vitamin D insufficiency   ng/mL=Optimal Vitamin D  >150 ng/mL=Possible toxicity       Vitamin D, 25-Hydroxy   Date Value Ref Range Status   07/21/2020 37.4 30.0 - 100.0 ng/mL Final     Comment:     <20 ng/mL  =  Vitamin D deficiency  20-29 ng/mL  =  Vitamin D insufficiency   ng/mL  =  Optimal Vitamin D  >150 ng/mL  =  Possible toxicity   04/30/2020 50.2 30.0 - 100.0 ng/mL Final     Comment:     <20 ng/mL  =  Vitamin D deficiency  20-29 ng/mL  =  Vitamin D insufficiency   ng/mL  =  Optimal Vitamin D  >150 ng/mL  =  Possible toxicity     INTACT PTH   Date Value Ref Range Status   12/07/2019 148 (H) 19 - 88 pg/mL Final     PTH, Intact   Date Value Ref Range Status   07/21/2020 130 (H) 19 - 88 pg/mL Final   04/30/2020 130 (H) 19 - 88 pg/mL Final     WBC (K/mcL)   Date Value   11/28/2020 6.2   11/27/2020 7.3    11/13/2020 6.2   12/07/2019 7.3   09/20/2019 5.8   06/22/2019 5.3     RBC (mil/mcL)   Date Value   11/28/2020 3.84 (L)   11/27/2020 4.78   11/13/2020 4.00 (L)   12/07/2019 4.56   09/20/2019 4.55   06/22/2019 4.29 (L)     HCT (%)   Date Value   11/28/2020 32.5 (L)   11/27/2020 39.3   11/13/2020 33.3 (L)   12/07/2019 37.3 (L)   09/20/2019 37.8 (L)   06/22/2019 36.6 (L)     HGB (g/dL)   Date Value   11/28/2020 10.6 (L)   11/27/2020 13.3   11/13/2020 11.1 (L)   12/07/2019 12.8 (L)   09/20/2019 12.8 (L)   06/22/2019 12.1 (L)     PLT (K/mcL)   Date Value   11/28/2020 287   11/27/2020 379   11/13/2020 329   12/07/2019 338   09/20/2019 274   06/22/2019 323     Sodium (mmol/L)   Date Value   11/28/2020 135   11/27/2020 134 (L)   11/13/2020 142   12/07/2019 142   09/20/2019 143   06/22/2019 143     Potassium (mmol/L)   Date Value   11/28/2020 5.4 (H)   11/27/2020 5.1   11/13/2020 5.1   12/07/2019 4.2   09/20/2019 4.2   06/22/2019 4.1     Chloride (mmol/L)   Date Value   11/28/2020 104   11/27/2020 101   11/13/2020 109 (H)   12/07/2019 107   09/20/2019 107   06/22/2019 109 (H)     Glucose (mg/dL)   Date Value   11/28/2020 96   11/27/2020 88   11/13/2020 82   12/07/2019 81   09/20/2019 128 (H)   06/22/2019 85     CALCIUM (mg/dL)   Date Value   12/07/2019 8.4   09/20/2019 8.8   06/22/2019 8.8     Calcium (mg/dL)   Date Value   11/28/2020 8.7   11/27/2020 8.6   11/13/2020 8.6     Carbon Dioxide (mmol/L)   Date Value   11/28/2020 18 (L)   11/27/2020 21   11/13/2020 25   12/07/2019 28   09/20/2019 28   06/22/2019 28     BUN (mg/dL)   Date Value   11/28/2020 51 (H)   11/27/2020 47 (H)   11/13/2020 53 (H)   12/07/2019 32 (H)   09/20/2019 31 (H)   06/22/2019 26 (H)     Creatinine (mg/dL)   Date Value   11/28/2020 6.80 (H)   11/27/2020 6.97 (H)   11/13/2020 5.20 (H)   12/07/2019 2.69 (H)   09/20/2019 2.45 (H)   06/22/2019 2.18 (H)       ASSESSMENT:  CKD Stage 5  HTN  Anemia  Proteinuria  Secondary Hyperparathyroidism  DJD  DM2  COVID  +    PLAN:  Well known to me.  He has CKD5 from diabetic nephropathy and nearing dialysis.   If he tejada snot show any improvement then he will need initiation of HD.  Continue same medication.  BP is stable on current regimen.  H/H stable, will recheck.  Avoid NSAIDS.  Check BMP, Mg, PO4, CBC in am.    Will follow.    Thank you very much for allowing me to participate in the care of your patient.  Please feel free to call me if any questions or concers.    Yaakov Betts MD.  Nephrology.

## 2020-12-14 NOTE — OCCUPATIONAL THERAPY INITIAL EVALUATION ADULT - PERTINENT HX OF CURRENT PROBLEM, REHAB EVAL
Pt is a 73 year old male with hx HTN, HLD, and DM Type II, who has been c/o lower back pains x years, but got worse since 1/2019. Pt has epidural injections with some relief & was referred to Dr Gambino for further evaluation had MRI done & surgery recommended. Pt is now s/p Lumbar Laminectomy on 5/14/19. 14-Dec-2020

## 2021-01-27 ENCOUNTER — APPOINTMENT (OUTPATIENT)
Dept: ORTHOPEDIC SURGERY | Facility: CLINIC | Age: 76
End: 2021-01-27
Payer: MEDICARE

## 2021-01-27 PROCEDURE — 72100 X-RAY EXAM L-S SPINE 2/3 VWS: CPT

## 2021-01-27 PROCEDURE — 99214 OFFICE O/P EST MOD 30 MIN: CPT

## 2021-02-10 ENCOUNTER — APPOINTMENT (OUTPATIENT)
Dept: ORTHOPEDIC SURGERY | Facility: CLINIC | Age: 76
End: 2021-02-10
Payer: MEDICARE

## 2021-02-10 VITALS
WEIGHT: 183 LBS | SYSTOLIC BLOOD PRESSURE: 166 MMHG | DIASTOLIC BLOOD PRESSURE: 73 MMHG | HEIGHT: 65 IN | HEART RATE: 60 BPM | BODY MASS INDEX: 30.49 KG/M2

## 2021-02-10 DIAGNOSIS — M54.5 LOW BACK PAIN: ICD-10-CM

## 2021-02-10 PROCEDURE — 20552 NJX 1/MLT TRIGGER POINT 1/2: CPT

## 2021-02-10 PROCEDURE — 99214 OFFICE O/P EST MOD 30 MIN: CPT | Mod: 25

## 2021-03-24 ENCOUNTER — APPOINTMENT (OUTPATIENT)
Dept: ORTHOPEDIC SURGERY | Facility: CLINIC | Age: 76
End: 2021-03-24
Payer: MEDICARE

## 2021-03-24 PROCEDURE — 99214 OFFICE O/P EST MOD 30 MIN: CPT | Mod: 25

## 2021-03-24 PROCEDURE — 20553 NJX 1/MLT TRIGGER POINTS 3/>: CPT

## 2021-04-28 ENCOUNTER — APPOINTMENT (OUTPATIENT)
Dept: ORTHOPEDIC SURGERY | Facility: CLINIC | Age: 76
End: 2021-04-28
Payer: MEDICARE

## 2021-04-28 DIAGNOSIS — M51.26 OTHER INTERVERTEBRAL DISC DISPLACEMENT, LUMBAR REGION: ICD-10-CM

## 2021-04-28 PROCEDURE — 99214 OFFICE O/P EST MOD 30 MIN: CPT

## 2021-05-12 ENCOUNTER — APPOINTMENT (OUTPATIENT)
Dept: ORTHOPEDIC SURGERY | Facility: CLINIC | Age: 76
End: 2021-05-12

## 2021-06-06 NOTE — ASU PREOP CHECKLIST - TYPE OF SOLUTION
Bob presents to the clinic alone.  Overall, feeling well!  Birth plan completed today.  Accepting of pertussis immunization today and understands  benefits.  Hemoglobin not redrawn today as patient has not picked up iron supplement prescription/OTC if not covered.  Iron rich food list was given again, and the importance of iron supplementation was emphasized not only for maternal health but fetal/ health (iron stores).  Baby is very active, and she denies regular uterine contractions, loss of fluid or vaginal bleeding.  Total weight gain within normal limits.   labor precautions and danger signs and symptoms reviewed.  All questions answered.  Encouraged daily fetal movement counting and to call or return to clinic with any questions, concerns, or as needed.   lt

## 2021-09-25 NOTE — ASU PATIENT PROFILE, ADULT - HARM RISK FACTORS
no [facility]  Dermatology  [address], [address]  [place] [address]  Phone: [phone]  Fax:

## 2021-10-27 ENCOUNTER — APPOINTMENT (OUTPATIENT)
Dept: PULMONOLOGY | Facility: CLINIC | Age: 76
End: 2021-10-27
Payer: MEDICARE

## 2021-10-27 VITALS
TEMPERATURE: 97.6 F | HEIGHT: 65 IN | WEIGHT: 185 LBS | BODY MASS INDEX: 30.82 KG/M2 | DIASTOLIC BLOOD PRESSURE: 59 MMHG | HEART RATE: 93 BPM | OXYGEN SATURATION: 98 % | SYSTOLIC BLOOD PRESSURE: 125 MMHG

## 2021-10-27 DIAGNOSIS — G47.33 OBSTRUCTIVE SLEEP APNEA (ADULT) (PEDIATRIC): ICD-10-CM

## 2021-10-27 PROCEDURE — 99204 OFFICE O/P NEW MOD 45 MIN: CPT | Mod: GC

## 2021-10-27 RX ORDER — SIMVASTATIN 20 MG/1
20 TABLET, FILM COATED ORAL
Refills: 0 | Status: ACTIVE | COMMUNITY

## 2021-10-27 RX ORDER — ISOSORBIDE DINITRATE 30 MG/1
30 TABLET ORAL
Refills: 0 | Status: ACTIVE | COMMUNITY

## 2021-10-27 RX ORDER — GLIMEPIRIDE 4 MG/1
4 TABLET ORAL
Refills: 0 | Status: ACTIVE | COMMUNITY

## 2021-10-27 RX ORDER — ESCITALOPRAM OXALATE 20 MG/1
20 TABLET, FILM COATED ORAL
Refills: 0 | Status: ACTIVE | COMMUNITY

## 2021-10-27 RX ORDER — METOPROLOL TARTRATE 50 MG/1
50 TABLET, FILM COATED ORAL
Refills: 0 | Status: ACTIVE | COMMUNITY

## 2021-10-27 RX ORDER — AMLODIPINE BESYLATE 5 MG/1
5 TABLET ORAL
Refills: 0 | Status: ACTIVE | COMMUNITY

## 2021-10-27 RX ORDER — FENOFIBRATE 145 MG/1
145 TABLET ORAL
Refills: 0 | Status: ACTIVE | COMMUNITY

## 2021-10-27 RX ORDER — DICLOFENAC SODIUM 75 MG/1
75 TABLET, DELAYED RELEASE ORAL
Qty: 60 | Refills: 0 | Status: DISCONTINUED | COMMUNITY
Start: 2019-08-28 | End: 2021-10-27

## 2021-10-27 RX ORDER — METFORMIN HYDROCHLORIDE 500 MG/1
500 TABLET, COATED ORAL
Refills: 0 | Status: ACTIVE | COMMUNITY

## 2021-10-27 RX ORDER — ENALAPRIL MALEATE 20 MG/1
20 TABLET ORAL
Refills: 0 | Status: ACTIVE | COMMUNITY

## 2021-10-27 RX ORDER — METHYLPREDNISOLONE 4 MG/1
4 TABLET ORAL
Qty: 1 | Refills: 0 | Status: DISCONTINUED | COMMUNITY
Start: 2021-02-10 | End: 2021-10-27

## 2021-10-27 NOTE — PHYSICAL EXAM
[General Appearance - Well Developed] : well developed [Normal Appearance] : normal appearance [General Appearance - Well Nourished] : well nourished [Normal Conjunctiva] : the conjunctiva exhibited no abnormalities [Low Lying Soft Palate] : low lying soft palate [Enlarged Base of the Tongue] : enlargement of the base of the tongue [Erythema] : erythema of the pharynx [IV] : IV [Neck Appearance] : the appearance of the neck was normal [Jugular Venous Distention Increased] : there was no jugular-venous distention [Heart Rate And Rhythm] : heart rate was normal and rhythm regular [Heart Sounds] : normal S1 and S2 [Heart Sounds Gallop] : no gallops [Murmurs] : no murmurs [Respiration, Rhythm And Depth] : normal respiratory rhythm and effort [Exaggerated Use Of Accessory Muscles For Inspiration] : no accessory muscle use [Abnormal Walk] : normal gait [Nail Clubbing] : no clubbing  or cyanosis of the fingernails [Musculoskeletal - Swelling] : no joint swelling seen [Skin Color & Pigmentation] : normal skin color and pigmentation [Skin Turgor] : normal skin turgor [] : no rash [No Focal Deficits] : no focal deficits [Oriented To Time, Place, And Person] : oriented to person, place, and time [Impaired Insight] : insight and judgment were intact [Affect] : the affect was normal [FreeTextEntry2] : no edema

## 2021-10-27 NOTE — ASSESSMENT
[FreeTextEntry1] : 77 yo M with hx of HTN, HLD, DM2, lumbar stenosis presenting for RENNY management\par \par 1) RENNY - Patient has a history of RENNY with symptoms and physical examination findings consistent with RENNY. While he may have required CPAP treatment prior, he has lost some weight since and may be a candidate for alternative therapy including OAT and Inspire. Given his resistant hypertension requiring 4 medications and his DM, it is paramount to treat his RENNY. We will start with a home sleep test to gauge the severity, and discuss treatment thereafter. The ramifications of obstructive sleep apnea and its potential therapeutic modalities were discussed with the patient. The dangers of drowsy driving were discussed with the patient. The patient was warned to avoid drowsy driving. The patient will followup after results of this study are available.\par

## 2021-10-27 NOTE — HISTORY OF PRESENT ILLNESS
[Snoring] : snoring [Witnessed Apneas] : witnessed sleep apnea [Frequent Nocturnal Awakening] : frequent nocturnal awakening [Awakening With Dry Mouth] : awakening with dry mouth [Daytime Somnolence] : daytime somnolence [To Bed: ___] : ~he/she~ goes to bed at [unfilled] [Arises: ___] : arises at [unfilled] [Sleep Onset Latency: ___ minutes] : sleep onset latency of [unfilled] minutes reported [Nocturnal Awakenings: ___] : ~he/she~ typically has [unfilled] nocturnal awakenings [Daytime Sleep: ___] : daytime sleep: [unfilled] [FreeTextEntry1] : 77 yo M with hx of HTN, HLD, DM2, lumbar stenosis presenting for RENNY management. Patient was diagnosed with RENNY more than a decade ago, which he believes was of moderate severity. He was provided a CPAP at that time but discontinued it about 6 years ago due to discomfort. Wife at bedside believes that his sleep continuity and fatigue was better on CPAP treatment, but patient is unclear what benefit it had conferred for him at the time.\par More recently patient has been having increasing fatigue and sleepiness during the daytime, which prompted him and his wife to seek help.  [Unintentional Sleep while Active] : no unintentional sleep while active [Unintentional Sleep While Inactive] : no unintentional sleep while inactive [Awakes Unrefreshed] : does not awake unrefreshed [Awakes with Headache] : no headache upon awakening [ESS] : 6

## 2021-11-29 ENCOUNTER — APPOINTMENT (OUTPATIENT)
Dept: SLEEP CENTER | Facility: CLINIC | Age: 76
End: 2021-11-29

## 2021-11-29 ENCOUNTER — RX RENEWAL (OUTPATIENT)
Age: 76
End: 2021-11-29

## 2021-11-29 RX ORDER — DICLOFENAC SODIUM 75 MG/1
75 TABLET, DELAYED RELEASE ORAL
Qty: 60 | Refills: 5 | Status: ACTIVE | COMMUNITY
Start: 2020-05-13 | End: 1900-01-01

## 2022-05-08 ENCOUNTER — RX RENEWAL (OUTPATIENT)
Age: 77
End: 2022-05-08

## 2022-05-08 RX ORDER — LIDOCAINE 5% 700 MG/1
5 PATCH TOPICAL
Qty: 30 | Refills: 3 | Status: ACTIVE | COMMUNITY
Start: 2021-02-10 | End: 1900-01-01

## 2022-08-10 ENCOUNTER — APPOINTMENT (OUTPATIENT)
Dept: ORTHOPEDIC SURGERY | Facility: CLINIC | Age: 77
End: 2022-08-10

## 2022-08-10 PROCEDURE — 72100 X-RAY EXAM L-S SPINE 2/3 VWS: CPT

## 2022-08-10 PROCEDURE — 99214 OFFICE O/P EST MOD 30 MIN: CPT | Mod: 25

## 2022-08-10 PROCEDURE — 20552 NJX 1/MLT TRIGGER POINT 1/2: CPT

## 2022-08-10 NOTE — HISTORY OF PRESENT ILLNESS
[Stable] : stable [de-identified] : 77 year male presents for a follow up evaluation.\par Patient reports New pain in right-side of low back that started 2 weeks ago, without any inciting injury.\par Pain radiates down lateral right thigh. Standing/walking worsen the pain. No numbness or tingling. \par S/P L2, L3, L4, L5, S1 laminectomy; fusion of L4-L5 using local autograft bone demineralized bone matrix with spinal cord monitoring in May 2019. \par Complete resolution of sciatic pain following surgery - no longer experiencing leg pain from sciatica. \par Was given R SI joint injection in January, February and March 2021. \par Playing baseball.\par Here with wife.\par He states that he did not feel much relief with the last injection.\par He did PT last year, which helped. \par Had MRI Lumbar last year in 2021. \par Ambulating with a cane now for 2 weeks, since onset of new pain. \par No fever chills sweats nausea vomiting no bowel or bladder dysfunction, no recent weight loss or gain no night pain. This history is in addition to the intake form that I personally reviewed.

## 2022-08-10 NOTE — PHYSICAL EXAM
[UE/LE] : Sensory: Intact in bilateral upper & lower extremities [ALL] : dorsalis pedis, posterior tibial, femoral, popliteal, and radial 2+ and symmetric bilaterally [Normal] : Oriented to person, place, and time, insight and judgement were intact and the affect was normal [Graham's Sign] : negative Graham's sign [Pronator Drift] : negative pronator drift [SLR] : negative straight leg raise [Poor Appearance] : well-appearing [Acute Distress] : not in acute distress [de-identified] : 5 out of 5 motor strength, sensation is intact and symmetrical full range of motion flexion extension and rotation, no palpatory tenderness full range of motion of hips knees shoulders and elbows (all four extremities), no atrophy, negative straight leg raise, no pathological reflexes, no swelling, normal ambulation, no apparent distress skin is intact, no palpable lymph nodes, no upper or lower extremity instability, alert and oriented x3 and normal mood. Normal finger-to nose test. \par Incision is healed with no signs of infection. \par Pain over right SI joint. \par  [de-identified] : XR AP Lat Lumbar 08/10/2022-laminectomy, Lumbar degenerative disc disease-reviewed with patient.

## 2022-08-10 NOTE — DISCUSSION/SUMMARY
[Surgical risks reviewed] : Surgical risks reviewed [de-identified] : s/p 1 year and 11 months L2, L3, L4, L5, S1 laminectomy; fusion of L4-L5 using local autograft bone demineralized bone matrix with spinal cord monitoring in May 2019. \par Lumbar disc herniation. \par Lumbar degenerative disc disease.\par Discussed all options. \par Right sacroiliitis.\par Discussed surgery.\par I offered an injection after all risks were explained including allergic reaction to an infection under sterile conditions 1 mg of Depo-Medrol and 2 cc of 1% lidocaine without epinephrine was injected into the painful site. The patient tolerated the procedure well and received significant relief following the injection.\par  All questions were answered, all alternatives discussed and the patient is in complete agreement with that plan. Follow-up appointment as instructed. Any issues and the patient will call or come in sooner. \par Patient's wife agrees with the plan. \par

## 2022-08-10 NOTE — ADDENDUM
[FreeTextEntry1] : This note was authored by Torie Watkins working as a medical scribe for Dr. Oswald Gambino. The note was reviewed, edited, and revised by Dr. Oswald Gambino whom is in agreement with the exam findings, imaging findings, and treatment plan. 04/28/2021.

## 2022-08-24 ENCOUNTER — APPOINTMENT (OUTPATIENT)
Dept: ORTHOPEDIC SURGERY | Facility: CLINIC | Age: 77
End: 2022-08-24

## 2022-08-24 PROCEDURE — 20552 NJX 1/MLT TRIGGER POINT 1/2: CPT

## 2022-08-24 PROCEDURE — 99214 OFFICE O/P EST MOD 30 MIN: CPT | Mod: 25

## 2022-08-24 NOTE — DISCUSSION/SUMMARY
[de-identified] : S/P lumbar laminectomy 5/2019.\par Right sacroiliitis.\par Discussed all options. \par I offered an injection after all risks were explained including allergic reaction to an infection under sterile conditions 1 mg of Depo-Medrol and 2 cc of 1% lidocaine without epinephrine was injected into the painful site. The patient tolerated the procedure well and received significant relief following the injection.\par PT and F/U 4-6 weeks.\par If no better MRI lumbar. \par All options discussed and patient involved in decision making process including rest, medicine, home exercise, acupuncture, Chiropractic care, Physical Therapy, Pain management, and last resort surgery. All questions were answered, all alternatives discussed and the patient is in complete agreement with that plan. Follow-up appointment as instructed. Any issues and the patient will call or come in sooner. \par Wife agrees with the plan.

## 2022-08-24 NOTE — PHYSICAL EXAM
[Normal] : Gait: normal [Graham's Sign] : negative Graham's sign [Pronator Drift] : negative pronator drift [SLR] : negative straight leg raise [de-identified] : 5 out of 5 motor strength, sensation is intact and symmetrical full range of motion flexion extension and rotation, no palpatory tenderness full range of motion of hips knees shoulders and elbows (all four extremities), no atrophy, negative straight leg raise, no pathological reflexes, no swelling, normal ambulation, no apparent distress skin is intact, no palpable lymph nodes, no upper or lower extremity instability, alert and oriented x3 and normal mood. Normal finger-to nose test. \par Pain over right SI joint.

## 2022-08-24 NOTE — HISTORY OF PRESENT ILLNESS
[Stable] : stable [de-identified] : 77 year male presents for a follow up evaluation of right sided lower back pain.\par S/P L2, L3, L4, L5, S1 laminectomy; fusion of L4-L5 using local autograft bone demineralized bone matrix with spinal cord monitoring in May 2019. \par Complete resolution of sciatic pain following surgery - no longer experiencing leg pain from sciatica. \par He was treated with right SI joint injection last visit which did not help. He requests another injection. \par Pain radiates down lateral right thigh. Standing/walking worsen the pain. No numbness or tingling. \par Plans to start PT. \par He is taking diclofenac once daily as needed. \par No fever chills sweats nausea vomiting no bowel or bladder dysfunction, no recent weight loss or gain no night pain. This history is in addition to the intake form that I personally reviewed. \par Here with wife.

## 2022-10-12 ENCOUNTER — APPOINTMENT (OUTPATIENT)
Dept: ORTHOPEDIC SURGERY | Facility: CLINIC | Age: 77
End: 2022-10-12

## 2022-11-23 ENCOUNTER — APPOINTMENT (OUTPATIENT)
Dept: ORTHOPEDIC SURGERY | Facility: CLINIC | Age: 77
End: 2022-11-23

## 2022-11-23 VITALS
DIASTOLIC BLOOD PRESSURE: 79 MMHG | BODY MASS INDEX: 29.99 KG/M2 | HEART RATE: 71 BPM | SYSTOLIC BLOOD PRESSURE: 145 MMHG | WEIGHT: 180 LBS | HEIGHT: 65 IN

## 2022-11-23 PROCEDURE — 99214 OFFICE O/P EST MOD 30 MIN: CPT

## 2022-11-23 RX ORDER — LIDOCAINE 5% 700 MG/1
5 PATCH TOPICAL
Qty: 30 | Refills: 3 | Status: ACTIVE | COMMUNITY
Start: 2022-11-23 | End: 1900-01-01

## 2022-11-23 RX ORDER — DICLOFENAC SODIUM 1% 10 MG/G
1 GEL TOPICAL
Qty: 300 | Refills: 0 | Status: ACTIVE | COMMUNITY
Start: 2022-11-23 | End: 1900-01-01

## 2022-11-23 NOTE — DISCUSSION/SUMMARY
[de-identified] : S/P lumbar laminectomy 5/2019.\par Right sacroiliitis.\par Discussed all options. \par Getting MRI pelvis.\par Lidocaine patch/Diclofenac gel.\par All options discussed and patient involved in decision making process including rest, medicine, home exercise, acupuncture, Chiropractic care, Physical Therapy, Pain management, and last resort surgery. All questions were answered, all alternatives discussed and the patient is in complete agreement with that plan. Follow-up appointment as instructed. Any issues and the patient will call or come in sooner. \par

## 2022-11-23 NOTE — PHYSICAL EXAM
[Normal] : Gait: normal [Graham's Sign] : negative Graham's sign [Pronator Drift] : negative pronator drift [SLR] : negative straight leg raise [de-identified] : 5 out of 5 motor strength, sensation is intact and symmetrical full range of motion flexion extension and rotation, no palpatory tenderness full range of motion of hips knees shoulders and elbows (all four extremities), no atrophy, negative straight leg raise, no pathological reflexes, no swelling, normal ambulation, no apparent distress skin is intact, no palpable lymph nodes, no upper or lower extremity instability, alert and oriented x3 and normal mood. Normal finger-to nose test. \par Pain over right SI joint.

## 2022-11-23 NOTE — HISTORY OF PRESENT ILLNESS
[Stable] : stable [de-identified] : 77 year male presents for a follow up evaluation of right sided lower back pain.\par S/P L2, L3, L4, L5, S1 laminectomy; fusion of L4-L5 using local autograft bone demineralized bone matrix with spinal cord monitoring in May 2019. \par Complete resolution of sciatic pain following surgery - no longer experiencing leg pain from sciatica. \par Pain radiates down lateral right thigh. Standing/walking worsen the pain. No numbness or tingling. \par He is taking diclofenac once daily as needed. \par He was treated with right SI joint injection last visit which helped mildly. \par He was referred to PT at last visit as well, however he did not feel much improvement. \par No fever chills sweats nausea vomiting no bowel or bladder dysfunction, no recent weight loss or gain no night pain. This history is in addition to the intake form that I personally reviewed. \par Here with wife. \par

## 2022-12-02 ENCOUNTER — APPOINTMENT (OUTPATIENT)
Dept: ORTHOPEDIC SURGERY | Facility: CLINIC | Age: 77
End: 2022-12-02

## 2022-12-02 DIAGNOSIS — M51.36 OTHER INTERVERTEBRAL DISC DEGENERATION, LUMBAR REGION: ICD-10-CM

## 2022-12-02 PROCEDURE — 99448 NTRPROF PH1/NTRNET/EHR 21-30: CPT

## 2022-12-12 ENCOUNTER — APPOINTMENT (OUTPATIENT)
Dept: ORTHOPEDIC SURGERY | Facility: CLINIC | Age: 77
End: 2022-12-12

## 2022-12-12 VITALS
TEMPERATURE: 97.9 F | DIASTOLIC BLOOD PRESSURE: 72 MMHG | OXYGEN SATURATION: 98 % | RESPIRATION RATE: 19 BRPM | WEIGHT: 185 LBS | HEART RATE: 72 BPM | SYSTOLIC BLOOD PRESSURE: 150 MMHG | HEIGHT: 65 IN | BODY MASS INDEX: 30.82 KG/M2

## 2022-12-12 DIAGNOSIS — M79.10 MYALGIA, UNSPECIFIED SITE: ICD-10-CM

## 2022-12-12 DIAGNOSIS — M60.9 MYOSITIS, UNSPECIFIED: ICD-10-CM

## 2022-12-12 DIAGNOSIS — M48.07 SPINAL STENOSIS, LUMBOSACRAL REGION: ICD-10-CM

## 2022-12-12 PROCEDURE — 99214 OFFICE O/P EST MOD 30 MIN: CPT | Mod: 25

## 2022-12-12 PROCEDURE — 20552 NJX 1/MLT TRIGGER POINT 1/2: CPT

## 2022-12-12 NOTE — HISTORY OF PRESENT ILLNESS
[Stable] : stable [de-identified] : 77 year male presents for a follow up evaluation of right sided lower back pain.\par S/P L2, L3, L4, L5, S1 laminectomy; fusion of L4-L5 using local autograft bone demineralized bone matrix with spinal cord monitoring in May 2019. \par Complete resolution of sciatic pain following surgery - no longer experiencing leg pain from sciatica. \par Standing/walking worsen the pain. No numbness or tingling. \par Was taking diclofenac ; no longer takes it because it offers no relief \par He was treated with right SI joint injection at prior visit which helped mildly. \par Presents today for repeat right SI joint injection. \par Going to [place]. \par No fever chills sweats nausea vomiting no bowel or bladder dysfunction, no recent weight loss or gain no night pain. This history is in addition to the intake form that I personally reviewed.

## 2022-12-12 NOTE — DISCUSSION/SUMMARY
[de-identified] : S/P lumbar laminectomy 5/2019.\par Right sacroiliitis.\par Discussed all options. \par I offered an injection after all risks were explained including allergic reaction to an infection under sterile conditions 1 mg of Depo-Medrol and 2 cc of 1% Lidocaine without epinephrine was injected into the painful site. The patient tolerated the procedure well and received significant relief following the injection. \par Going to [place]. \par All options discussed and patient involved in decision making process including rest, medicine, home exercise, acupuncture, Chiropractic care, Physical Therapy, Pain management, and last resort surgery. All questions were answered, all alternatives discussed and the patient is in complete agreement with that plan. Follow-up appointment as instructed. Any issues and the patient will call or come in sooner. \par

## 2022-12-12 NOTE — ADDENDUM
[FreeTextEntry1] : This note was written by Jacques Luo on 12/12/2022 acting as scribe for Dr. Oswald Gambino M.D.\par \par I, Oswald Gambino MD, have read and attest that all the information, medical decision making and discharge instructions within are true and accurate.

## 2022-12-12 NOTE — PHYSICAL EXAM
[Normal] : Gait: normal [Graham's Sign] : negative Graham's sign [Pronator Drift] : negative pronator drift [SLR] : negative straight leg raise [de-identified] : 5 out of 5 motor strength, sensation is intact and symmetrical full range of motion flexion extension and rotation, no palpatory tenderness full range of motion of hips knees shoulders and elbows (all four extremities), no atrophy, negative straight leg raise, no swelling, normal ambulation, no apparent distress skin is intact, no upper or lower extremity instability, alert and oriented x 3 and normal mood. Normal finger-to nose test. \par Pain over right SI joint. [de-identified] : I reviewed, interpreted and clinically correlated the following outside imaging studies, \par MRI Pelvis 11/28/22 ([facility]) - mild osteoarthritis of the right hip and tear of the superior labrum within limits of technique. Mild levoscoliosis and multilevel degenerative disc and facet joint disease of the visualized lower lumbar spine results in spinal canal and foraminal stenosis. Edema of the visualized lower paraspinal muscles compatible with low grade strain. Mild tendinosis of the bilateral proximal hamstring tendon complex.

## 2025-02-04 NOTE — REVIEW OF SYSTEMS
Problem: Pain  Goal: Acceptable pain level achieved/maintained at rest using appropriate pain scale for the patient  Outcome: Monitoring/Evaluating progress  Goal: Acceptable pain level achieved/maintained with activity using appropriate pain scale for the patient  Outcome: Monitoring/Evaluating progress  Goal: Acceptable pain level achieved/maintained without oversedation  Outcome: Monitoring/Evaluating progress     Problem: Diabetes  Goal: Achieves glycemic balance  Description: Goal is to maintain blood sugar within range with no episodes of hypoglycemia  Outcome: Monitoring/Evaluating progress  Goal: Verbalizes/demonstrates understanding of NEW diagnosis of diabetes and management  Description: Document on Patient Education Activity  Outcome: Monitoring/Evaluating progress  Goal: Verbalizes understanding of diabetes management including how to use HbA1C to evaluate status of blood sugar over time (Diabetes is NOT a new diagnosis)  Description: Diabetes Education  Outcome: Monitoring/Evaluating progress  Goal: Demonstrates ability to self-administer insulin  Description: Document on Patient Education Activity  Outcome: Monitoring/Evaluating progress      [Negative] : Heme/Lymph